# Patient Record
Sex: MALE | Race: WHITE | Employment: OTHER | URBAN - METROPOLITAN AREA
[De-identification: names, ages, dates, MRNs, and addresses within clinical notes are randomized per-mention and may not be internally consistent; named-entity substitution may affect disease eponyms.]

---

## 2022-08-13 ENCOUNTER — APPOINTMENT (OUTPATIENT)
Dept: GENERAL RADIOLOGY | Age: 72
End: 2022-08-13
Payer: MEDICARE

## 2022-08-13 ENCOUNTER — APPOINTMENT (OUTPATIENT)
Dept: CT IMAGING | Age: 72
End: 2022-08-13
Payer: MEDICARE

## 2022-08-13 ENCOUNTER — HOSPITAL ENCOUNTER (EMERGENCY)
Age: 72
Discharge: HOME | End: 2022-08-13
Attending: STUDENT IN AN ORGANIZED HEALTH CARE EDUCATION/TRAINING PROGRAM
Payer: MEDICARE

## 2022-08-13 VITALS
DIASTOLIC BLOOD PRESSURE: 70 MMHG | SYSTOLIC BLOOD PRESSURE: 147 MMHG | OXYGEN SATURATION: 94 % | HEART RATE: 60 BPM | WEIGHT: 175 LBS | BODY MASS INDEX: 25.92 KG/M2 | HEIGHT: 69 IN | TEMPERATURE: 97.7 F | RESPIRATION RATE: 13 BRPM

## 2022-08-13 DIAGNOSIS — R55 VASOVAGAL SYNCOPE: ICD-10-CM

## 2022-08-13 DIAGNOSIS — S61.011A LACERATION OF RIGHT THUMB WITHOUT FOREIGN BODY WITHOUT DAMAGE TO NAIL, INITIAL ENCOUNTER: Primary | ICD-10-CM

## 2022-08-13 LAB
ALBUMIN SERPL-MCNC: 3.8 G/DL (ref 3.5–5.2)
ALP BLD-CCNC: 76 U/L (ref 40–129)
ALT SERPL-CCNC: 16 U/L (ref 0–40)
ANION GAP SERPL CALCULATED.3IONS-SCNC: 12 MMOL/L (ref 7–16)
AST SERPL-CCNC: 21 U/L (ref 0–39)
BACTERIA: ABNORMAL /HPF
BASOPHILS ABSOLUTE: 0.06 E9/L (ref 0–0.2)
BASOPHILS RELATIVE PERCENT: 0.7 % (ref 0–2)
BETA-HYDROXYBUTYRATE: 0.15 MMOL/L (ref 0.02–0.27)
BILIRUB SERPL-MCNC: <0.2 MG/DL (ref 0–1.2)
BILIRUBIN URINE: NEGATIVE
BLOOD, URINE: ABNORMAL
BUN BLDV-MCNC: 21 MG/DL (ref 6–23)
CALCIUM SERPL-MCNC: 9.4 MG/DL (ref 8.6–10.2)
CHLORIDE BLD-SCNC: 101 MMOL/L (ref 98–107)
CHP ED QC CHECK: NORMAL
CLARITY: CLEAR
CO2: 23 MMOL/L (ref 22–29)
COLOR: YELLOW
CREAT SERPL-MCNC: 1.3 MG/DL (ref 0.7–1.2)
EOSINOPHILS ABSOLUTE: 0.23 E9/L (ref 0.05–0.5)
EOSINOPHILS RELATIVE PERCENT: 2.6 % (ref 0–6)
GFR AFRICAN AMERICAN: >60
GFR NON-AFRICAN AMERICAN: 54 ML/MIN/1.73
GLUCOSE BLD-MCNC: 158 MG/DL (ref 74–99)
GLUCOSE BLD-MCNC: 200 MG/DL
GLUCOSE URINE: NEGATIVE MG/DL
HCT VFR BLD CALC: 38.2 % (ref 37–54)
HEMOGLOBIN: 11.8 G/DL (ref 12.5–16.5)
IMMATURE GRANULOCYTES #: 0.03 E9/L
IMMATURE GRANULOCYTES %: 0.3 % (ref 0–5)
KETONES, URINE: NEGATIVE MG/DL
LACTIC ACID: 1.8 MMOL/L (ref 0.5–2.2)
LACTIC ACID: 2.7 MMOL/L (ref 0.5–2.2)
LEUKOCYTE ESTERASE, URINE: NEGATIVE
LIPASE: 55 U/L (ref 13–60)
LYMPHOCYTES ABSOLUTE: 1.15 E9/L (ref 1.5–4)
LYMPHOCYTES RELATIVE PERCENT: 12.8 % (ref 20–42)
MCH RBC QN AUTO: 27.5 PG (ref 26–35)
MCHC RBC AUTO-ENTMCNC: 30.9 % (ref 32–34.5)
MCV RBC AUTO: 89 FL (ref 80–99.9)
METER GLUCOSE: 200 MG/DL (ref 74–99)
MONOCYTES ABSOLUTE: 0.89 E9/L (ref 0.1–0.95)
MONOCYTES RELATIVE PERCENT: 9.9 % (ref 2–12)
NEUTROPHILS ABSOLUTE: 6.62 E9/L (ref 1.8–7.3)
NEUTROPHILS RELATIVE PERCENT: 73.7 % (ref 43–80)
NITRITE, URINE: NEGATIVE
PDW BLD-RTO: 15.9 FL (ref 11.5–15)
PH UA: 5.5 (ref 5–9)
PH VENOUS: 7.39 (ref 7.35–7.45)
PLATELET # BLD: 187 E9/L (ref 130–450)
PMV BLD AUTO: 10.3 FL (ref 7–12)
POTASSIUM REFLEX MAGNESIUM: 4 MMOL/L (ref 3.5–5)
PRO-BNP: 400 PG/ML (ref 0–125)
PROTEIN UA: ABNORMAL MG/DL
RBC # BLD: 4.29 E12/L (ref 3.8–5.8)
RBC UA: ABNORMAL /HPF (ref 0–2)
SODIUM BLD-SCNC: 136 MMOL/L (ref 132–146)
SPECIFIC GRAVITY UA: 1.01 (ref 1–1.03)
TOTAL PROTEIN: 6.7 G/DL (ref 6.4–8.3)
TROPONIN, HIGH SENSITIVITY: 11 NG/L (ref 0–11)
TROPONIN, HIGH SENSITIVITY: 12 NG/L (ref 0–11)
TSH SERPL DL<=0.05 MIU/L-ACNC: 9.14 UIU/ML (ref 0.27–4.2)
UROBILINOGEN, URINE: 0.2 E.U./DL
WBC # BLD: 9 E9/L (ref 4.5–11.5)
WBC UA: ABNORMAL /HPF (ref 0–5)

## 2022-08-13 PROCEDURE — 83690 ASSAY OF LIPASE: CPT

## 2022-08-13 PROCEDURE — 93005 ELECTROCARDIOGRAM TRACING: CPT | Performed by: STUDENT IN AN ORGANIZED HEALTH CARE EDUCATION/TRAINING PROGRAM

## 2022-08-13 PROCEDURE — 2580000003 HC RX 258: Performed by: STUDENT IN AN ORGANIZED HEALTH CARE EDUCATION/TRAINING PROGRAM

## 2022-08-13 PROCEDURE — 84484 ASSAY OF TROPONIN QUANT: CPT

## 2022-08-13 PROCEDURE — 83605 ASSAY OF LACTIC ACID: CPT

## 2022-08-13 PROCEDURE — 73130 X-RAY EXAM OF HAND: CPT

## 2022-08-13 PROCEDURE — 70450 CT HEAD/BRAIN W/O DYE: CPT

## 2022-08-13 PROCEDURE — 82010 KETONE BODYS QUAN: CPT

## 2022-08-13 PROCEDURE — 83880 ASSAY OF NATRIURETIC PEPTIDE: CPT

## 2022-08-13 PROCEDURE — 85025 COMPLETE CBC W/AUTO DIFF WBC: CPT

## 2022-08-13 PROCEDURE — 2500000003 HC RX 250 WO HCPCS: Performed by: STUDENT IN AN ORGANIZED HEALTH CARE EDUCATION/TRAINING PROGRAM

## 2022-08-13 PROCEDURE — 96372 THER/PROPH/DIAG INJ SC/IM: CPT

## 2022-08-13 PROCEDURE — 90714 TD VACC NO PRESV 7 YRS+ IM: CPT | Performed by: STUDENT IN AN ORGANIZED HEALTH CARE EDUCATION/TRAINING PROGRAM

## 2022-08-13 PROCEDURE — 71045 X-RAY EXAM CHEST 1 VIEW: CPT

## 2022-08-13 PROCEDURE — 84443 ASSAY THYROID STIM HORMONE: CPT

## 2022-08-13 PROCEDURE — 81001 URINALYSIS AUTO W/SCOPE: CPT

## 2022-08-13 PROCEDURE — 90471 IMMUNIZATION ADMIN: CPT | Performed by: STUDENT IN AN ORGANIZED HEALTH CARE EDUCATION/TRAINING PROGRAM

## 2022-08-13 PROCEDURE — 82962 GLUCOSE BLOOD TEST: CPT

## 2022-08-13 PROCEDURE — 96360 HYDRATION IV INFUSION INIT: CPT

## 2022-08-13 PROCEDURE — 36415 COLL VENOUS BLD VENIPUNCTURE: CPT

## 2022-08-13 PROCEDURE — 99285 EMERGENCY DEPT VISIT HI MDM: CPT

## 2022-08-13 PROCEDURE — 80053 COMPREHEN METABOLIC PANEL: CPT

## 2022-08-13 PROCEDURE — 12002 RPR S/N/AX/GEN/TRNK2.6-7.5CM: CPT

## 2022-08-13 PROCEDURE — 82800 BLOOD PH: CPT

## 2022-08-13 PROCEDURE — 6360000002 HC RX W HCPCS: Performed by: STUDENT IN AN ORGANIZED HEALTH CARE EDUCATION/TRAINING PROGRAM

## 2022-08-13 RX ORDER — EZETIMIBE 10 MG/1
10 TABLET ORAL DAILY
COMMUNITY

## 2022-08-13 RX ORDER — ATORVASTATIN CALCIUM 40 MG/1
40 TABLET, FILM COATED ORAL DAILY
COMMUNITY

## 2022-08-13 RX ORDER — 0.9 % SODIUM CHLORIDE 0.9 %
1000 INTRAVENOUS SOLUTION INTRAVENOUS ONCE
Status: COMPLETED | OUTPATIENT
Start: 2022-08-13 | End: 2022-08-13

## 2022-08-13 RX ORDER — LOSARTAN POTASSIUM 25 MG/1
25 TABLET ORAL DAILY
COMMUNITY

## 2022-08-13 RX ORDER — TRAVOPROST OPHTHALMIC SOLUTION 0.04 MG/ML
1 SOLUTION OPHTHALMIC NIGHTLY
COMMUNITY

## 2022-08-13 RX ORDER — LEVOTHYROXINE SODIUM 0.12 MG/1
125 TABLET ORAL DAILY
COMMUNITY

## 2022-08-13 RX ORDER — TETANUS AND DIPHTHERIA TOXOIDS ADSORBED 2; 2 [LF]/.5ML; [LF]/.5ML
0.5 INJECTION INTRAMUSCULAR ONCE
Status: COMPLETED | OUTPATIENT
Start: 2022-08-13 | End: 2022-08-13

## 2022-08-13 RX ORDER — ESCITALOPRAM OXALATE 20 MG/1
20 TABLET ORAL DAILY
COMMUNITY

## 2022-08-13 RX ORDER — LIDOCAINE HYDROCHLORIDE 10 MG/ML
20 INJECTION, SOLUTION INFILTRATION; PERINEURAL ONCE
Status: COMPLETED | OUTPATIENT
Start: 2022-08-13 | End: 2022-08-13

## 2022-08-13 RX ADMIN — SODIUM CHLORIDE 1000 ML: 9 INJECTION, SOLUTION INTRAVENOUS at 16:47

## 2022-08-13 RX ADMIN — TETANUS AND DIPHTHERIA TOXOIDS ADSORBED 0.5 ML: 2; 2 INJECTION INTRAMUSCULAR at 16:41

## 2022-08-13 RX ADMIN — LIDOCAINE HYDROCHLORIDE 20 ML: 10 INJECTION, SOLUTION INFILTRATION; PERINEURAL at 17:43

## 2022-08-13 RX ADMIN — SODIUM CHLORIDE 1000 ML: 9 INJECTION, SOLUTION INTRAVENOUS at 17:47

## 2022-08-13 NOTE — ED PROVIDER NOTES
Department of Emergency Medicine   ED Provider Note  Admit Date/RoomTime: 8/13/2022  3:13 PM  ED Room: 01/01          History of Present Illness:  8/13/22, Time: 3:31 PM EDT         Zulma Cheadle is a 66 y.o. male w/ hx of DM, Grave's disease, COPD, presenting to the ED for altered mental status, beginning this morning. The complaint has been intermittent, moderate in severity, and worsened by nothing. Per family patient was confused and altered this morning, patient does not remember what happened. States that he was hypoglycemic, but did not have anything to check his blood glucose, given 3 glucose tabs orally by family. Per EMS blood glucose greater than 500. Patient currently feels well, states that this morning he accidentally slammed his right thumb in a car door. He denies any chest pain or shortness of breath, denies any new vision changes, endorses a mild headache. Denies any falls or head trauma. He denies any abdominal pain or nausea or vomiting or diarrhea. Denies any fevers or chills, but states he felt warm this morning. He denies any diarrhea or constipation or urinary symptoms. He denies any similar episodes in the past.  He thinks he had tetanus vaccine around 2 years ago. Review of Systems:     Pertinent positives and negatives are stated within HPI. 10 point ROS otherwise negative.  --------------------------------------------- PAST HISTORY ---------------------------------------------  Past Medical History:  has a past medical history of COPD (chronic obstructive pulmonary disease) ([facility] [address] Past Surgical History:  has no past surgical history on file. Social History:  reports that he has been smoking cigarettes. He has been smoking an average of 1 pack per day. He has never used smokeless tobacco. He reports that he does not use drugs. Family History: family history is not on file. The patients home medications have been reviewed. Allergies:  Avelox [moxifloxacin], Morphine, Penicillins, and Sulfa antibiotics        ---------------------------------------------------PHYSICAL EXAM--------------------------------------    Constitutional/General: AAO to person/place/time/purpose, NAD, no labored breathing  Head: Normocephalic and atraumatic  Eyes: EOMI, conjunctiva normal, sclera non icteric  Mouth: Moist mucous membranes, uvula midline  Neck: Supple, no stridor, no meningeal signs  Respiratory: Lungs clear to auscultation bilaterally, no wheezes, rales, or rhonchi. Not in respiratory distress  Cardiovascular:  Regular rate. Regular rhythm. No murmurs, no gallops, or rubs. 2+ distal pulses. Equal extremity pulses. Chest: No chest wall tenderness or deformity  GI:  Abdomen Soft, Non tender, Non distended. No rebound, guarding, or rigidity. No pulsatile masses. Musculoskeletal: Moves all extremities x 4. Warm and well perfused, no clubbing, cyanosis, or edema. Capillary refill <3 seconds  Integument: skin warm and dry. 3 cm laceration right thumb pad. Neurologic: GCS 15, no focal deficits. Initial NIH 0. Patient alert/keenly responsive, oriented to month and age. Following commands appropriately. No aphasia or dysarthria, able to identify objects and describe their use. No extinction or inattention. CN II-XII intact (e.g. extraocular movements intact, facial sensation intact to light touch, no facial asymmetry, tongue midline with intact lateral movements, normal speech, hearing grossly intact bilaterally, no gross visual field deficits bilaterally, no weakness with shrug/lateral head movements)  No drift of bilateral upper and lower extremities bilaterally (no drift for 10 sec w/ arms, no drift 5 sec w/ legs). Strength 5/5 in major muscle groups of bilateral upper and lower extremities. Sensation intact to light touch and sharp touch throughout dermatomes of bilateral upper and lower extremities.   No limb ataxia with normal finger-to-nose testing and heel-to-shin testing bilaterally. Psychiatric: Normal Affect      -----------------------------------------PROCEDURES----------------------------------------------------  Laceration Repair Procedure Note    Indication: Laceration    Procedure: The patient was placed in the appropriate position and anesthesia around the laceration was obtained with a full digital block of the right thumb using 1% Lidocaine without epinephrine. The area was then cleansed with betadine and draped in a sterile fashion. The laceration was irrigated with copious months of sterile saline. No foreign bodies were identified. No significant subungual hematoma. The laceration was closed with 5-0 Prolene using interrupted sutures. There were no additional lacerations requiring repair. The wound area was then dressed with bacitracin and a sterile dressing. Minimal debridement was preformed, flaps were aligned. No foreign body was identified. Total repaired wound length: 3 cm. Other Items: Suture count: 4    The patient tolerated the procedure well. Complications: None     -------------------------------------------------- RESULTS -------------------------------------------------  I have personally reviewed all laboratory and imaging results for this patient. Results are listed below.      LABS:  Results for orders placed or performed during the hospital encounter of 08/13/22   CBC with Auto Differential   Result Value Ref Range    WBC 9.0 4.5 - 11.5 E9/L    RBC 4.29 3.80 - 5.80 E12/L    Hemoglobin 11.8 (L) 12.5 - 16.5 g/dL    Hematocrit 38.2 37.0 - 54.0 %    MCV 89.0 80.0 - 99.9 fL    MCH 27.5 26.0 - 35.0 pg    MCHC 30.9 (L) 32.0 - 34.5 %    RDW 15.9 (H) 11.5 - 15.0 fL    Platelets 678 315 - 754 E9/L    MPV 10.3 7.0 - 12.0 fL    Neutrophils % 73.7 43.0 - 80.0 %    Immature Granulocytes % 0.3 0.0 - 5.0 %    Lymphocytes % 12.8 (L) 20.0 - 42.0 %    Monocytes % 9.9 2.0 - 12.0 %    Eosinophils % 2.6 0.0 - 6.0 %    Basophils % 0.7 0.0 - 2.0 % Neutrophils Absolute 6.62 1.80 - 7.30 E9/L    Immature Granulocytes # 0.03 E9/L    Lymphocytes Absolute 1.15 (L) 1.50 - 4.00 E9/L    Monocytes Absolute 0.89 0.10 - 0.95 E9/L    Eosinophils Absolute 0.23 0.05 - 0.50 E9/L    Basophils Absolute 0.06 0.00 - 0.20 E9/L   Comprehensive Metabolic Panel w/ Reflex to MG   Result Value Ref Range    Sodium 136 132 - 146 mmol/L    Potassium reflex Magnesium 4.0 3.5 - 5.0 mmol/L    Chloride 101 98 - 107 mmol/L    CO2 23 22 - 29 mmol/L    Anion Gap 12 7 - 16 mmol/L    Glucose 158 (H) 74 - 99 mg/dL    BUN 21 6 - 23 mg/dL    Creatinine 1.3 (H) 0.7 - 1.2 mg/dL    GFR Non-African American 54 >=60 mL/min/1.73    GFR African American >60     Calcium 9.4 8.6 - 10.2 mg/dL    Total Protein 6.7 6.4 - 8.3 g/dL    Albumin 3.8 3.5 - 5.2 g/dL    Total Bilirubin <0.2 0.0 - 1.2 mg/dL    Alkaline Phosphatase 76 40 - 129 U/L    ALT 16 0 - 40 U/L    AST 21 0 - 39 U/L   Lactic Acid   Result Value Ref Range    Lactic Acid 2.7 (H) 0.5 - 2.2 mmol/L   Urinalysis with Microscopic   Result Value Ref Range    Color, UA Yellow Straw/Yellow    Clarity, UA Clear Clear    Glucose, Ur Negative Negative mg/dL    Bilirubin Urine Negative Negative    Ketones, Urine Negative Negative mg/dL    Specific Gravity, UA 1.015 1.005 - 1.030    Blood, Urine TRACE (A) Negative    pH, UA 5.5 5.0 - 9.0    Protein, UA TRACE Negative mg/dL    Urobilinogen, Urine 0.2 <2.0 E.U./dL    Nitrite, Urine Negative Negative    Leukocyte Esterase, Urine Negative Negative    WBC, UA NONE 0 - 5 /HPF    RBC, UA NONE 0 - 2 /HPF    Bacteria, UA NONE SEEN None Seen /HPF   Troponin   Result Value Ref Range    Troponin, High Sensitivity 12 (H) 0 - 11 ng/L   Lipase   Result Value Ref Range    Lipase 55 13 - 60 U/L   Brain Natriuretic Peptide   Result Value Ref Range    Pro- (H) 0 - 125 pg/mL   TSH   Result Value Ref Range    TSH 9.140 (H) 0.270 - 4.200 uIU/mL   PH, VENOUS   Result Value Ref Range    pH, [place] 7.39 7.35 - 7.45 Beta-Hydroxybutyrate   Result Value Ref Range    Beta-Hydroxybutyrate 0.15 0.02 - 0.27 mmol/L   Troponin   Result Value Ref Range    Troponin, High Sensitivity 11 0 - 11 ng/L   Lactic Acid   Result Value Ref Range    Lactic Acid 1.8 0.5 - 2.2 mmol/L   POCT Glucose   Result Value Ref Range    Glucose 200 mg/dL    QC OK? ok    POCT Glucose   Result Value Ref Range    Meter Glucose 200 (H) 74 - 99 mg/dL   EKG 12 Lead   Result Value Ref Range    Ventricular Rate 56 BPM    Atrial Rate 56 BPM    P-R Interval 136 ms    QRS Duration 102 ms    Q-T Interval 440 ms    QTc Calculation (Bazett) 424 ms    P Axis 22 degrees    R Axis -5 degrees    T Axis 28 degrees       RADIOLOGY:  Interpreted by Radiologist unless otherwise specified  XR CHEST 1 VIEW   Final Result   No pneumonia or pleural effusion. XR HAND RIGHT (MIN 3 VIEWS)   Final Result   No evidence of fracture or dislocation. CT Head WO Contrast   Final Result   No acute intracranial abnormality. EKG:      See ED Course for EKG documentation        ------------------------- NURSING NOTES AND VITALS REVIEWED ---------------------------   The nursing notes within the ED encounter and vital signs as below have been reviewed by myself. BP (!) 147/70   Pulse 60   Temp 97.7 °F (36.5 °C)   Resp 13   Ht 5' 9\" (1.753 m)   Wt 175 lb (79.4 kg)   SpO2 94%   BMI 25.84 kg/m²   Oxygen Saturation Interpretation: Normal    The patients available past medical records and past encounters were reviewed.         ------------------------------ ED COURSE/MEDICAL DECISION MAKING----------------------  Medications   diptheria-tetanus toxoids [facility]) 2-2 LF/0.5ML injection 0.5 mL (0.5 mLs IntraMUSCular Given 8/13/22 1641)   0.9 % sodium chloride bolus (0 mLs IntraVENous Stopped 8/13/22 1750)   0.9 % sodium chloride bolus (0 mLs IntraVENous Stopped 8/13/22 0713)   lidocaine 1 % injection 20 mL (20 mLs IntraDERmal Given by Other 8/13/22 8541) Medical Decision Making: This is a 13-year-old male presenting to the emergency department for episode of altered mental status, thumb laceration. Initial history was gathered from patient and he did not remember the episode. Further information gathered from wife is that patient had episode of confusion after seeing the blood from his thumb, suspect vasovagal syncope. CT imaging obtained of the head without acute intracranial process. Patient here with no focal neurodeficits, fully oriented. Patient normotensive, afebrile, not tachycardic, well-appearing. No lactic acidosis, stable on repeat. Troponin stable on repeat. No evidence of DKA, lab work overall unremarkable and reassuring. Patient updated on tetanus vaccine, x-ray obtained of the thumb without evidence of foreign body or acute fracture dislocation. Laceration was repaired. Given patient's reassuring work-up, exam, patient will be discharged home with outpatient follow-up and strict return precautions. Patient and wife comfortable with this plan all questions were answered. See ED COURSE for additional MDM. Labs & imaging reviewed and interpreted, see RESULTS above. Re-Evaluations:             ED Course as of 08/14/22 0037   Sun Aug 14, 2022   6372 EKG: This EKG is signed and interpreted by me. Rate: 56  Rhythm: Sinus  Interpretation: no acute changes  Comparison: no previous EKG   [RH]      ED Course User Index  [RH] [address], DO         This patient's ED course included: a personal history and physicial examination, re-evaluation prior to disposition, multiple bedside re-evaluations, cardiac monitoring, and continuous pulse oximetry    This patient has remained hemodynamically stable during their ED course. Consultations:  See ED Course    Counseling:    The emergency provider has spoken with the patient and spouse/SO and discussed todays results, in addition to providing specific details for the plan of care and counseling regarding the diagnosis and prognosis. Questions are answered at this time and they are agreeable with the plan.       --------------------------------- IMPRESSION AND DISPOSITION ---------------------------------    IMPRESSION  1. Laceration of right thumb without foreign body without damage to nail, initial encounter    2. Vasovagal syncope        DISPOSITION  Disposition: Discharge to home  Patient condition is stable    NOTE: This report was transcribed using voice recognition software. Every effort was made to ensure accuracy; however, inadvertent computerized transcription errors may be present. Also please note that patient was seen and examined by attending physician. Plan of care and disposition discussed with attending physician and they were immediately available or present for all procedures performed.        -- Chanel Masters D.O. PGY-3     Resident Physician     Emergency Medicine      8/14/2022 12:37 AM         [address] DO  Resident  08/14/22 9411

## 2022-08-14 LAB
EKG ATRIAL RATE: 56 BPM
EKG P AXIS: 22 DEGREES
EKG P-R INTERVAL: 136 MS
EKG Q-T INTERVAL: 440 MS
EKG QRS DURATION: 102 MS
EKG QTC CALCULATION (BAZETT): 424 MS
EKG R AXIS: -5 DEGREES
EKG T AXIS: 28 DEGREES
EKG VENTRICULAR RATE: 56 BPM

## 2024-04-29 ENCOUNTER — APPOINTMENT (OUTPATIENT)
Dept: RADIOLOGY | Facility: HOSPITAL | Age: 74
End: 2024-04-29
Payer: MEDICARE

## 2024-04-29 ENCOUNTER — APPOINTMENT (OUTPATIENT)
Dept: CARDIOLOGY | Facility: HOSPITAL | Age: 74
End: 2024-04-29
Payer: MEDICARE

## 2024-04-29 ENCOUNTER — HOSPITAL ENCOUNTER (OUTPATIENT)
Dept: CARDIOLOGY | Facility: HOSPITAL | Age: 74
Discharge: HOME | End: 2024-04-29
Payer: MEDICARE

## 2024-04-29 ENCOUNTER — HOSPITAL ENCOUNTER (OUTPATIENT)
Facility: HOSPITAL | Age: 74
Setting detail: OBSERVATION
Discharge: HOME | End: 2024-04-30
Attending: EMERGENCY MEDICINE | Admitting: INTERNAL MEDICINE
Payer: MEDICARE

## 2024-04-29 DIAGNOSIS — R06.89 DYSPNEA AND RESPIRATORY ABNORMALITY: ICD-10-CM

## 2024-04-29 DIAGNOSIS — I67.89 OTHER CEREBROVASCULAR DISEASE: ICD-10-CM

## 2024-04-29 DIAGNOSIS — R56.9 SEIZURE-LIKE ACTIVITY (MULTI): ICD-10-CM

## 2024-04-29 DIAGNOSIS — R29.90 STROKE-LIKE SYMPTOMS: Primary | ICD-10-CM

## 2024-04-29 DIAGNOSIS — R42 DYSEQUILIBRIUM: ICD-10-CM

## 2024-04-29 DIAGNOSIS — I24.9 ACS (ACUTE CORONARY SYNDROME) (MULTI): ICD-10-CM

## 2024-04-29 DIAGNOSIS — R06.00 DYSPNEA AND RESPIRATORY ABNORMALITY: ICD-10-CM

## 2024-04-29 DIAGNOSIS — R55 SYNCOPE AND COLLAPSE: ICD-10-CM

## 2024-04-29 DIAGNOSIS — N17.9 AKI (ACUTE KIDNEY INJURY) (CMS-HCC): ICD-10-CM

## 2024-04-29 DIAGNOSIS — R60.0 LOCALIZED EDEMA: ICD-10-CM

## 2024-04-29 LAB
ALBUMIN SERPL BCP-MCNC: 4.1 G/DL (ref 3.4–5)
ALP SERPL-CCNC: 95 U/L (ref 33–136)
ALT SERPL W P-5'-P-CCNC: 14 U/L (ref 10–52)
ANION GAP SERPL CALC-SCNC: 11 MMOL/L (ref 10–20)
APPEARANCE UR: CLEAR
AST SERPL W P-5'-P-CCNC: 19 U/L (ref 9–39)
BASOPHILS # BLD AUTO: 0.06 X10*3/UL (ref 0–0.1)
BASOPHILS NFR BLD AUTO: 0.8 %
BILIRUB SERPL-MCNC: 0.3 MG/DL (ref 0–1.2)
BILIRUB UR STRIP.AUTO-MCNC: NEGATIVE MG/DL
BNP SERPL-MCNC: 86 PG/ML (ref 0–99)
BUN SERPL-MCNC: 17 MG/DL (ref 6–23)
CALCIUM SERPL-MCNC: 9.4 MG/DL (ref 8.6–10.3)
CARDIAC TROPONIN I PNL SERPL HS: 8 NG/L (ref 0–20)
CHLORIDE SERPL-SCNC: 104 MMOL/L (ref 98–107)
CHOLEST SERPL-MCNC: 121 MG/DL (ref 0–199)
CHOLESTEROL/HDL RATIO: 3.4
CO2 SERPL-SCNC: 26 MMOL/L (ref 21–32)
COLOR UR: ABNORMAL
CREAT SERPL-MCNC: 1.56 MG/DL (ref 0.5–1.3)
D DIMER PPP FEU-MCNC: 1097 NG/ML FEU
EGFRCR SERPLBLD CKD-EPI 2021: 46 ML/MIN/1.73M*2
EOSINOPHIL # BLD AUTO: 0.21 X10*3/UL (ref 0–0.4)
EOSINOPHIL NFR BLD AUTO: 2.7 %
ERYTHROCYTE [DISTWIDTH] IN BLOOD BY AUTOMATED COUNT: 19.2 % (ref 11.5–14.5)
GLUCOSE BLD MANUAL STRIP-MCNC: 91 MG/DL (ref 74–99)
GLUCOSE SERPL-MCNC: 107 MG/DL (ref 74–99)
GLUCOSE UR STRIP.AUTO-MCNC: ABNORMAL MG/DL
HCT VFR BLD AUTO: 44.8 % (ref 41–52)
HDLC SERPL-MCNC: 35.2 MG/DL
HGB BLD-MCNC: 13.4 G/DL (ref 13.5–17.5)
IMM GRANULOCYTES # BLD AUTO: 0.02 X10*3/UL (ref 0–0.5)
IMM GRANULOCYTES NFR BLD AUTO: 0.3 % (ref 0–0.9)
KETONES UR STRIP.AUTO-MCNC: NEGATIVE MG/DL
LDLC SERPL CALC-MCNC: 42 MG/DL
LEUKOCYTE ESTERASE UR QL STRIP.AUTO: NEGATIVE
LYMPHOCYTES # BLD AUTO: 1.65 X10*3/UL (ref 0.8–3)
LYMPHOCYTES NFR BLD AUTO: 21.2 %
MAGNESIUM SERPL-MCNC: 2.19 MG/DL (ref 1.6–2.4)
MCH RBC QN AUTO: 23.6 PG (ref 26–34)
MCHC RBC AUTO-ENTMCNC: 29.9 G/DL (ref 32–36)
MCV RBC AUTO: 79 FL (ref 80–100)
MONOCYTES # BLD AUTO: 1.01 X10*3/UL (ref 0.05–0.8)
MONOCYTES NFR BLD AUTO: 13 %
NEUTROPHILS # BLD AUTO: 4.82 X10*3/UL (ref 1.6–5.5)
NEUTROPHILS NFR BLD AUTO: 62 %
NITRITE UR QL STRIP.AUTO: NEGATIVE
NON HDL CHOLESTEROL: 86 MG/DL (ref 0–149)
NRBC BLD-RTO: 0 /100 WBCS (ref 0–0)
PH UR STRIP.AUTO: 5 [PH]
PLATELET # BLD AUTO: 275 X10*3/UL (ref 150–450)
POTASSIUM SERPL-SCNC: 3.9 MMOL/L (ref 3.5–5.3)
PROT SERPL-MCNC: 7.6 G/DL (ref 6.4–8.2)
PROT UR STRIP.AUTO-MCNC: ABNORMAL MG/DL
RBC # BLD AUTO: 5.68 X10*6/UL (ref 4.5–5.9)
RBC # UR STRIP.AUTO: ABNORMAL /UL
RBC #/AREA URNS AUTO: NORMAL /HPF
SODIUM SERPL-SCNC: 137 MMOL/L (ref 136–145)
SP GR UR STRIP.AUTO: 1.01
TRIGL SERPL-MCNC: 221 MG/DL (ref 0–149)
UROBILINOGEN UR STRIP.AUTO-MCNC: <2 MG/DL
VLDL: 44 MG/DL (ref 0–40)
WBC # BLD AUTO: 7.8 X10*3/UL (ref 4.4–11.3)
WBC #/AREA URNS AUTO: NORMAL /HPF

## 2024-04-29 PROCEDURE — 83880 ASSAY OF NATRIURETIC PEPTIDE: CPT | Performed by: NURSE PRACTITIONER

## 2024-04-29 PROCEDURE — 36415 COLL VENOUS BLD VENIPUNCTURE: CPT | Performed by: NURSE PRACTITIONER

## 2024-04-29 PROCEDURE — 2500000004 HC RX 250 GENERAL PHARMACY W/ HCPCS (ALT 636 FOR OP/ED): Performed by: STUDENT IN AN ORGANIZED HEALTH CARE EDUCATION/TRAINING PROGRAM

## 2024-04-29 PROCEDURE — 82947 ASSAY GLUCOSE BLOOD QUANT: CPT | Mod: 59

## 2024-04-29 PROCEDURE — 71045 X-RAY EXAM CHEST 1 VIEW: CPT | Performed by: RADIOLOGY

## 2024-04-29 PROCEDURE — 2500000001 HC RX 250 WO HCPCS SELF ADMINISTERED DRUGS (ALT 637 FOR MEDICARE OP): Performed by: STUDENT IN AN ORGANIZED HEALTH CARE EDUCATION/TRAINING PROGRAM

## 2024-04-29 PROCEDURE — 72125 CT NECK SPINE W/O DYE: CPT | Performed by: STUDENT IN AN ORGANIZED HEALTH CARE EDUCATION/TRAINING PROGRAM

## 2024-04-29 PROCEDURE — 80061 LIPID PANEL: CPT | Performed by: STUDENT IN AN ORGANIZED HEALTH CARE EDUCATION/TRAINING PROGRAM

## 2024-04-29 PROCEDURE — 96372 THER/PROPH/DIAG INJ SC/IM: CPT | Performed by: STUDENT IN AN ORGANIZED HEALTH CARE EDUCATION/TRAINING PROGRAM

## 2024-04-29 PROCEDURE — 83036 HEMOGLOBIN GLYCOSYLATED A1C: CPT | Performed by: STUDENT IN AN ORGANIZED HEALTH CARE EDUCATION/TRAINING PROGRAM

## 2024-04-29 PROCEDURE — 99285 EMERGENCY DEPT VISIT HI MDM: CPT | Mod: 25

## 2024-04-29 PROCEDURE — 72125 CT NECK SPINE W/O DYE: CPT

## 2024-04-29 PROCEDURE — 83735 ASSAY OF MAGNESIUM: CPT | Performed by: NURSE PRACTITIONER

## 2024-04-29 PROCEDURE — 85379 FIBRIN DEGRADATION QUANT: CPT | Performed by: NURSE PRACTITIONER

## 2024-04-29 PROCEDURE — 71045 X-RAY EXAM CHEST 1 VIEW: CPT

## 2024-04-29 PROCEDURE — 93005 ELECTROCARDIOGRAM TRACING: CPT

## 2024-04-29 PROCEDURE — 80053 COMPREHEN METABOLIC PANEL: CPT | Performed by: NURSE PRACTITIONER

## 2024-04-29 PROCEDURE — G0378 HOSPITAL OBSERVATION PER HR: HCPCS

## 2024-04-29 PROCEDURE — 85025 COMPLETE CBC W/AUTO DIFF WBC: CPT | Performed by: NURSE PRACTITIONER

## 2024-04-29 PROCEDURE — 70450 CT HEAD/BRAIN W/O DYE: CPT

## 2024-04-29 PROCEDURE — 84484 ASSAY OF TROPONIN QUANT: CPT | Performed by: NURSE PRACTITIONER

## 2024-04-29 PROCEDURE — 2500000004 HC RX 250 GENERAL PHARMACY W/ HCPCS (ALT 636 FOR OP/ED): Performed by: EMERGENCY MEDICINE

## 2024-04-29 PROCEDURE — 70450 CT HEAD/BRAIN W/O DYE: CPT | Performed by: RADIOLOGY

## 2024-04-29 PROCEDURE — 81001 URINALYSIS AUTO W/SCOPE: CPT | Performed by: NURSE PRACTITIONER

## 2024-04-29 RX ORDER — LATANOPROST 50 UG/ML
1 SOLUTION/ DROPS OPHTHALMIC NIGHTLY
Status: DISCONTINUED | OUTPATIENT
Start: 2024-04-29 | End: 2024-05-01 | Stop reason: HOSPADM

## 2024-04-29 RX ORDER — SODIUM CHLORIDE 9 MG/ML
125 INJECTION, SOLUTION INTRAVENOUS CONTINUOUS
Status: DISCONTINUED | OUTPATIENT
Start: 2024-04-29 | End: 2024-04-30

## 2024-04-29 RX ORDER — BUPROPION HYDROCHLORIDE 150 MG/1
150 TABLET ORAL DAILY
Status: DISCONTINUED | OUTPATIENT
Start: 2024-04-30 | End: 2024-05-01 | Stop reason: HOSPADM

## 2024-04-29 RX ORDER — ACETAMINOPHEN 325 MG/1
650 TABLET ORAL EVERY 4 HOURS PRN
Status: DISCONTINUED | OUTPATIENT
Start: 2024-04-29 | End: 2024-05-01 | Stop reason: HOSPADM

## 2024-04-29 RX ORDER — METOPROLOL SUCCINATE 50 MG/1
50 TABLET, EXTENDED RELEASE ORAL DAILY
Status: DISCONTINUED | OUTPATIENT
Start: 2024-04-30 | End: 2024-05-01 | Stop reason: HOSPADM

## 2024-04-29 RX ORDER — LANOLIN ALCOHOL/MO/W.PET/CERES
1000 CREAM (GRAM) TOPICAL DAILY
Status: DISCONTINUED | OUTPATIENT
Start: 2024-04-30 | End: 2024-05-01 | Stop reason: HOSPADM

## 2024-04-29 RX ORDER — GABAPENTIN 100 MG/1
100 CAPSULE ORAL DAILY
Status: DISCONTINUED | OUTPATIENT
Start: 2024-04-30 | End: 2024-05-01 | Stop reason: HOSPADM

## 2024-04-29 RX ORDER — ATORVASTATIN CALCIUM 40 MG/1
40 TABLET, FILM COATED ORAL DAILY
Status: DISCONTINUED | OUTPATIENT
Start: 2024-04-30 | End: 2024-05-01 | Stop reason: HOSPADM

## 2024-04-29 RX ORDER — FLAXSEED OIL 1000 MG
1 CAPSULE ORAL DAILY
COMMUNITY

## 2024-04-29 RX ORDER — ESCITALOPRAM OXALATE 20 MG/1
20 TABLET ORAL DAILY
COMMUNITY

## 2024-04-29 RX ORDER — ALBUTEROL SULFATE 90 UG/1
2 AEROSOL, METERED RESPIRATORY (INHALATION) EVERY 6 HOURS PRN
COMMUNITY

## 2024-04-29 RX ORDER — LEVOTHYROXINE SODIUM 125 UG/1
125 TABLET ORAL DAILY
COMMUNITY

## 2024-04-29 RX ORDER — EZETIMIBE 10 MG/1
10 TABLET ORAL DAILY
COMMUNITY

## 2024-04-29 RX ORDER — ALBUTEROL SULFATE 90 UG/1
2 AEROSOL, METERED RESPIRATORY (INHALATION) EVERY 6 HOURS PRN
Status: DISCONTINUED | OUTPATIENT
Start: 2024-04-29 | End: 2024-04-29

## 2024-04-29 RX ORDER — HEPARIN SODIUM 5000 [USP'U]/ML
5000 INJECTION, SOLUTION INTRAVENOUS; SUBCUTANEOUS EVERY 8 HOURS
Status: DISCONTINUED | OUTPATIENT
Start: 2024-04-29 | End: 2024-05-01 | Stop reason: HOSPADM

## 2024-04-29 RX ORDER — GABAPENTIN 100 MG/1
100 CAPSULE ORAL DAILY
COMMUNITY

## 2024-04-29 RX ORDER — ATORVASTATIN CALCIUM 40 MG/1
40 TABLET, FILM COATED ORAL DAILY
COMMUNITY

## 2024-04-29 RX ORDER — INSULIN LISPRO 100 [IU]/ML
0-5 INJECTION, SOLUTION INTRAVENOUS; SUBCUTANEOUS
Status: DISCONTINUED | OUTPATIENT
Start: 2024-04-29 | End: 2024-05-01 | Stop reason: HOSPADM

## 2024-04-29 RX ORDER — ESCITALOPRAM OXALATE 10 MG/1
20 TABLET ORAL DAILY
Status: DISCONTINUED | OUTPATIENT
Start: 2024-04-30 | End: 2024-05-01 | Stop reason: HOSPADM

## 2024-04-29 RX ORDER — EZETIMIBE 10 MG/1
10 TABLET ORAL DAILY
Status: DISCONTINUED | OUTPATIENT
Start: 2024-04-30 | End: 2024-05-01 | Stop reason: HOSPADM

## 2024-04-29 RX ORDER — NAPROXEN SODIUM 220 MG/1
81 TABLET, FILM COATED ORAL DAILY
Status: DISCONTINUED | OUTPATIENT
Start: 2024-04-29 | End: 2024-05-01 | Stop reason: HOSPADM

## 2024-04-29 RX ORDER — ACETAMINOPHEN 500 MG
5 TABLET ORAL NIGHTLY PRN
Status: DISCONTINUED | OUTPATIENT
Start: 2024-04-29 | End: 2024-05-01 | Stop reason: HOSPADM

## 2024-04-29 RX ORDER — FLUTICASONE FUROATE AND VILANTEROL 200; 25 UG/1; UG/1
1 POWDER RESPIRATORY (INHALATION)
Status: DISCONTINUED | OUTPATIENT
Start: 2024-04-29 | End: 2024-05-01 | Stop reason: HOSPADM

## 2024-04-29 RX ORDER — FAMOTIDINE 20 MG/1
20 TABLET, FILM COATED ORAL DAILY
Status: DISCONTINUED | OUTPATIENT
Start: 2024-04-30 | End: 2024-05-01 | Stop reason: HOSPADM

## 2024-04-29 RX ORDER — GLUCOSAM/CHONDRO/HERB 149/HYAL 750-100 MG
1 TABLET ORAL DAILY
Status: DISCONTINUED | OUTPATIENT
Start: 2024-04-29 | End: 2024-04-29 | Stop reason: RX

## 2024-04-29 RX ORDER — DEXTROSE 50 % IN WATER (D50W) INTRAVENOUS SYRINGE
25
Status: DISCONTINUED | OUTPATIENT
Start: 2024-04-29 | End: 2024-05-01 | Stop reason: HOSPADM

## 2024-04-29 RX ORDER — ACETAMINOPHEN 650 MG/1
650 SUPPOSITORY RECTAL EVERY 4 HOURS PRN
Status: DISCONTINUED | OUTPATIENT
Start: 2024-04-29 | End: 2024-05-01 | Stop reason: HOSPADM

## 2024-04-29 RX ORDER — SODIUM CHLORIDE 9 MG/ML
125 INJECTION, SOLUTION INTRAVENOUS CONTINUOUS
Status: CANCELLED | OUTPATIENT
Start: 2024-04-29 | End: 2024-04-29

## 2024-04-29 RX ORDER — METOPROLOL SUCCINATE 50 MG/1
50 TABLET, EXTENDED RELEASE ORAL DAILY
COMMUNITY

## 2024-04-29 RX ORDER — DEXTROSE 50 % IN WATER (D50W) INTRAVENOUS SYRINGE
12.5
Status: DISCONTINUED | OUTPATIENT
Start: 2024-04-29 | End: 2024-05-01 | Stop reason: HOSPADM

## 2024-04-29 RX ORDER — LEVOTHYROXINE SODIUM 125 UG/1
125 TABLET ORAL DAILY
Status: DISCONTINUED | OUTPATIENT
Start: 2024-04-30 | End: 2024-05-01 | Stop reason: HOSPADM

## 2024-04-29 RX ORDER — ONDANSETRON HYDROCHLORIDE 2 MG/ML
4 INJECTION, SOLUTION INTRAVENOUS EVERY 8 HOURS PRN
Status: DISCONTINUED | OUTPATIENT
Start: 2024-04-29 | End: 2024-05-01 | Stop reason: HOSPADM

## 2024-04-29 RX ORDER — BUPROPION HYDROCHLORIDE 150 MG/1
150 TABLET ORAL DAILY
COMMUNITY

## 2024-04-29 RX ORDER — LANOLIN ALCOHOL/MO/W.PET/CERES
1000 CREAM (GRAM) TOPICAL DAILY
COMMUNITY

## 2024-04-29 RX ORDER — ACETAMINOPHEN 160 MG/5ML
650 SOLUTION ORAL EVERY 4 HOURS PRN
Status: DISCONTINUED | OUTPATIENT
Start: 2024-04-29 | End: 2024-05-01 | Stop reason: HOSPADM

## 2024-04-29 RX ORDER — TRAVOPROST OPHTHALMIC SOLUTION 0.04 MG/ML
1 SOLUTION OPHTHALMIC NIGHTLY
COMMUNITY

## 2024-04-29 RX ORDER — CHOLECALCIFEROL (VITAMIN D3) 1250 MCG
50000 TABLET ORAL
COMMUNITY

## 2024-04-29 RX ORDER — ONDANSETRON 4 MG/1
4 TABLET, ORALLY DISINTEGRATING ORAL EVERY 8 HOURS PRN
Status: DISCONTINUED | OUTPATIENT
Start: 2024-04-29 | End: 2024-05-01 | Stop reason: HOSPADM

## 2024-04-29 RX ORDER — ALBUTEROL SULFATE 90 UG/1
2 AEROSOL, METERED RESPIRATORY (INHALATION) EVERY 2 HOUR PRN
Status: DISCONTINUED | OUTPATIENT
Start: 2024-04-29 | End: 2024-05-01 | Stop reason: HOSPADM

## 2024-04-29 RX ORDER — FAMOTIDINE 20 MG/1
20 TABLET, FILM COATED ORAL DAILY
COMMUNITY

## 2024-04-29 RX ORDER — GLUCOSAM/CHONDRO/HERB 149/HYAL 750-100 MG
1 TABLET ORAL DAILY
COMMUNITY

## 2024-04-29 RX ADMIN — HEPARIN SODIUM 5000 UNITS: 5000 INJECTION INTRAVENOUS; SUBCUTANEOUS at 18:13

## 2024-04-29 RX ADMIN — ACETAMINOPHEN 650 MG: 325 TABLET ORAL at 20:28

## 2024-04-29 RX ADMIN — ASPIRIN 81 MG CHEWABLE TABLET 81 MG: 81 TABLET CHEWABLE at 18:13

## 2024-04-29 RX ADMIN — SODIUM CHLORIDE 125 ML/HR: 9 INJECTION, SOLUTION INTRAVENOUS at 16:17

## 2024-04-29 SDOH — SOCIAL STABILITY: SOCIAL INSECURITY: HAVE YOU HAD ANY THOUGHTS OF HARMING ANYONE ELSE?: NO

## 2024-04-29 SDOH — SOCIAL STABILITY: SOCIAL INSECURITY: HAS ANYONE EVER THREATENED TO HURT YOUR FAMILY OR YOUR PETS?: NO

## 2024-04-29 SDOH — SOCIAL STABILITY: SOCIAL INSECURITY: WERE YOU ABLE TO COMPLETE ALL THE BEHAVIORAL HEALTH SCREENINGS?: YES

## 2024-04-29 SDOH — SOCIAL STABILITY: SOCIAL INSECURITY: DO YOU FEEL UNSAFE GOING BACK TO THE PLACE WHERE YOU ARE LIVING?: NO

## 2024-04-29 SDOH — SOCIAL STABILITY: SOCIAL INSECURITY: HAVE YOU HAD THOUGHTS OF HARMING ANYONE ELSE?: NO

## 2024-04-29 SDOH — SOCIAL STABILITY: SOCIAL INSECURITY: DO YOU FEEL ANYONE HAS EXPLOITED OR TAKEN ADVANTAGE OF YOU FINANCIALLY OR OF YOUR PERSONAL PROPERTY?: NO

## 2024-04-29 SDOH — SOCIAL STABILITY: SOCIAL INSECURITY: ARE THERE ANY APPARENT SIGNS OF INJURIES/BEHAVIORS THAT COULD BE RELATED TO ABUSE/NEGLECT?: NO

## 2024-04-29 SDOH — SOCIAL STABILITY: SOCIAL INSECURITY: ARE YOU OR HAVE YOU BEEN THREATENED OR ABUSED PHYSICALLY, EMOTIONALLY, OR SEXUALLY BY ANYONE?: NO

## 2024-04-29 SDOH — SOCIAL STABILITY: SOCIAL INSECURITY: DOES ANYONE TRY TO KEEP YOU FROM HAVING/CONTACTING OTHER FRIENDS OR DOING THINGS OUTSIDE YOUR HOME?: NO

## 2024-04-29 SDOH — SOCIAL STABILITY: SOCIAL INSECURITY: ABUSE: ADULT

## 2024-04-29 ASSESSMENT — PATIENT HEALTH QUESTIONNAIRE - PHQ9
2. FEELING DOWN, DEPRESSED OR HOPELESS: NOT AT ALL
SUM OF ALL RESPONSES TO PHQ9 QUESTIONS 1 & 2: 0
1. LITTLE INTEREST OR PLEASURE IN DOING THINGS: NOT AT ALL

## 2024-04-29 ASSESSMENT — COGNITIVE AND FUNCTIONAL STATUS - GENERAL
CLIMB 3 TO 5 STEPS WITH RAILING: A LITTLE
MOVING TO AND FROM BED TO CHAIR: A LITTLE
PATIENT BASELINE BEDBOUND: NO
TOILETING: A LITTLE
STANDING UP FROM CHAIR USING ARMS: A LITTLE
MOBILITY SCORE: 20
WALKING IN HOSPITAL ROOM: A LITTLE
DAILY ACTIVITIY SCORE: 23

## 2024-04-29 ASSESSMENT — COLUMBIA-SUICIDE SEVERITY RATING SCALE - C-SSRS
2. HAVE YOU ACTUALLY HAD ANY THOUGHTS OF KILLING YOURSELF?: NO
1. IN THE PAST MONTH, HAVE YOU WISHED YOU WERE DEAD OR WISHED YOU COULD GO TO SLEEP AND NOT WAKE UP?: NO
1. IN THE PAST MONTH, HAVE YOU WISHED YOU WERE DEAD OR WISHED YOU COULD GO TO SLEEP AND NOT WAKE UP?: NO
6. HAVE YOU EVER DONE ANYTHING, STARTED TO DO ANYTHING, OR PREPARED TO DO ANYTHING TO END YOUR LIFE?: NO
6. HAVE YOU EVER DONE ANYTHING, STARTED TO DO ANYTHING, OR PREPARED TO DO ANYTHING TO END YOUR LIFE?: NO
2. HAVE YOU ACTUALLY HAD ANY THOUGHTS OF KILLING YOURSELF?: NO

## 2024-04-29 ASSESSMENT — LIFESTYLE VARIABLES
EVER HAD A DRINK FIRST THING IN THE MORNING TO STEADY YOUR NERVES TO GET RID OF A HANGOVER: NO
SKIP TO QUESTIONS 9-10: 1
HOW MANY STANDARD DRINKS CONTAINING ALCOHOL DO YOU HAVE ON A TYPICAL DAY: PATIENT DOES NOT DRINK
HAVE PEOPLE ANNOYED YOU BY CRITICIZING YOUR DRINKING: NO
AUDIT-C TOTAL SCORE: 0
HAVE YOU EVER FELT YOU SHOULD CUT DOWN ON YOUR DRINKING: NO
TOTAL SCORE: 0
HOW OFTEN DO YOU HAVE 6 OR MORE DRINKS ON ONE OCCASION: NEVER
SUBSTANCE_ABUSE_PAST_12_MONTHS: NO
EVER FELT BAD OR GUILTY ABOUT YOUR DRINKING: NO
HOW OFTEN DO YOU HAVE A DRINK CONTAINING ALCOHOL: NEVER
AUDIT-C TOTAL SCORE: 0
PRESCIPTION_ABUSE_PAST_12_MONTHS: NO

## 2024-04-29 ASSESSMENT — ACTIVITIES OF DAILY LIVING (ADL)
LACK_OF_TRANSPORTATION: NO
PATIENT'S MEMORY ADEQUATE TO SAFELY COMPLETE DAILY ACTIVITIES?: YES
GROOMING: INDEPENDENT
BATHING: INDEPENDENT
HEARING - RIGHT EAR: FUNCTIONAL
FEEDING YOURSELF: INDEPENDENT
DRESSING YOURSELF: INDEPENDENT
JUDGMENT_ADEQUATE_SAFELY_COMPLETE_DAILY_ACTIVITIES: YES
TOILETING: INDEPENDENT
WALKS IN HOME: NEEDS ASSISTANCE
ADEQUATE_TO_COMPLETE_ADL: YES
HEARING - LEFT EAR: FUNCTIONAL

## 2024-04-29 ASSESSMENT — PAIN SCALES - GENERAL: PAINLEVEL_OUTOF10: 8

## 2024-04-29 ASSESSMENT — PAIN - FUNCTIONAL ASSESSMENT: PAIN_FUNCTIONAL_ASSESSMENT: 0-10

## 2024-04-29 NOTE — H&P
"Medicine History & Physical    Patient Name: Dillon Reyes   YOB: 1950    Subjective:    Dillon Reyes is a 73 y.o. male with HTN, HLD, DM, CAD s/p PCI, Graves ophthalmopathy, history of TIA who presents to the hospital with balance issues. He had an episode where he says he lost consciousness after returning from the bathroom very early Saturday morning. Denies prodromal symptoms. He hit his head, unknown for sure on what. His wife had heard him fall and he was sitting up against the wall when she arrived and when she asked him what happened, all he had said was \"dizzy\". The patient does not remember the episode and says he could hear his wife calling out for him but could not move or talk for about a minute. He has had a bilateral frontal headache since the fall, but has improved compared to onset and is improved with tylenol. The patient has also had difficulty with balance every time he walks and with occasional \"shift\" in his vision. The patient has had word finding difficulty and some memory issues since the fall. He had some numbness, tingling and stiffness of his right hand, along with his right arm feeling cold, but these symptoms have subsided. He typically sleeps on his right side, but has been unable to since the fall because of nausea which resolves if he turns to his left or forward. The nausea sensation occurs only if his body is on the right side, denies this occurring if he just moves his head to the right. He has baseline issues with vision of his left eye from Graves and wears a patch. He has his baseline cough and dyspnea from COPD. He says his last TIA was at least 10 years ago, but doesn't remember what his symptoms were. Denies focal weakness, room spinning dizziness, lightheadedness, vomiting, diarrhea, abdominal pain, leg pain/swelling. Denies any new medications or change in medications.    Vitals with HR 66, /69. Creatinine 1.56 (was 1.97 12/2023), D-dimer 1097. CXR " negative. CT head/C spine negative for acute process. He was started on IV fluids.    A 10 point ROS was completed and is negative expect as stated in HPI.     Past Medical History:  COPD/chronic bronchitis  Graves and iatrogenic hypothyroidism  DM  PTSD  Anxiety/depression  CKD III  HTN  HLD  CAD  TIA    Past Surgical History:  Cholecystectomy  Coronary stents  Arthroscopy of knees    Social History: Tobacco - Current User, smokes 1 ppd, Alcohol - none, Recreational Drugs - none    Family History: MI (dad), DM (dad), cancer (mother)    Objective:    /69   Pulse 66   Temp 36.7 °C (98.1 °F)   Resp 18   Wt 85.7 kg (189 lb)   SpO2 94%   BMI 29.84 kg/m²     Physical Exam:  General:  Pleasant and cooperative. No apparent distress.  HEENT:  Normocephalic, bruising right temporal forehead, moist mucous membranes. Patch over left eye. Hearing aid.  Chest:  Clear to auscultation bilaterally. Non-labored breathing.  CV:  Regular rate and rhythm.    Abdomen: Abdomen is soft, non-tender, non-distended.   Extremities:  Trace lower extremity edema.   Neurological: AAOx3. Vision and ocular testing not performed on left eye. CN II-IV, VI intact for right eye. CN V, VII-XII intact. Abnormal finger to nose bilaterally, worse on right. Normal heel to shin. 5/5 strength. Normal sensation. Rarely says close but wrong word (ex. Bladder instead of gallbladder) or has to correct himself.   Skin:  Warm and dry.  Psych: Appropriate affect    Assessment/Plan:  #Dysequilibrium, word finding difficulty  #Syncope/fall  #History of TIA  -Differential includes CVA, BPPV, concussion, vs other, less likely seizure  -Consult neurology  -Order MRI brain and MRA head/neck, echo  -Check A1c and lipid panel  -Resume home atorvastatin 40 mg daily and ezetimibe. Start aspirin. Will defer addition of plavix to neuro.   -Holter monitor at discharge  -PT/OT  -Stop IV fluids from ED after receive 1 liter. Check orthostatics.  -Elevated D-dimer  noted, no symptoms/signs of VTE, would not pursue further evaluation at this time    #HTN  #HLD  #DM2  #COPD  #Depression/anxiety/PTSD  #Graves with iatrogenic hypothyroidism  #CAD s/p PCI  #CKD III  #Tobacco dependence  -Resume wellbutrin, lexapro, inhaler, gabapentin, metoprolol succinate, levothyroxine. Statin and ezetimibe as above.  -Hold jardiance for now. Start SSI.  -Declines nicotine patch at this time    DVT Prophylaxis: heparin SQ  Code Status: full  Consults: neuro

## 2024-04-29 NOTE — ED PROVIDER NOTES
HPI   Chief Complaint   Patient presents with    Fall    Syncope     Pt arrives private auto from home. States Friday night/early sat AM he had syncopal episode in hallway and fell on floor. States he hit his head and head hurts. States balance still feels off and having pain in head. Denies anticoagulants. States nausea/vision issues Saturday after falling. Has not been seen since fall.        Patient 73-year-old male, medical history significant for coronary vascular disease, Graves' disease with resulting double vision, history of optic neuritis, COPD not on home oxygen, presents to the emergency department with acute onset dizziness, fall, and head injury.  Patient was in his normal state of health.  He states that Friday night and Saturday morning, he was ambulating to the bathroom.  He states he cannot recall exactly what happened but he fell to the ground.  Shortly thereafter, he states that he was feeling very dizzy.  He has had persistent dizziness since then.  He states he had a difficult time walking.  He is also had trouble with word finding.  He denies any history of prior stroke.  He is not on anticoagulation.  He denies any chest pain or shortness of breath.                          No data recorded                   Patient History   Past Medical History:   Diagnosis Date    Atherosclerotic heart disease of native coronary artery without angina pectoris 11/08/2020    Coronary artery disease    Diplopia 09/13/2022    Diplopia    Dizziness and giddiness 04/29/2015    Vertigo    Eyelid retraction left upper eyelid 09/13/2022    Eyelid retraction left upper eyelid    Eyelid retraction right upper eyelid 09/13/2022    Eyelid retraction right upper eyelid    Headache, unspecified 04/08/2015    Headache    Other optic neuritis 09/13/2022    Compressive optic neuropathy    Other specified disorders of eye and adnexa 09/13/2022    Thyroid eye disease    Personal history of other diseases of the circulatory  system     History of cardiac disorder    Personal history of other diseases of the circulatory system     History of hypertension    Personal history of other diseases of the circulatory system     History of coronary artery disease    Personal history of other diseases of the circulatory system 11/11/2018    History of coronary artery disease    Personal history of other diseases of the musculoskeletal system and connective tissue     History of arthritis    Personal history of other diseases of the respiratory system     History of lung disease    Personal history of other endocrine, nutritional and metabolic disease     History of hyperlipidemia    Personal history of other endocrine, nutritional and metabolic disease     History of hypothyroidism    Personal history of other mental and behavioral disorders     History of depression    Syncope and collapse 11/08/2020    Syncope    Thyrotoxicosis 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 storm     Graves' eye disease    Transient cerebral ischemic attack, unspecified     TIA (transient ischemic attack)    Unspecified exophthalmos 09/13/2022    Proptosis     Past Surgical History:   Procedure Laterality Date    CARDIAC CATHETERIZATION  10/18/2018    Cardiac Cath Procedure Outcome:    CARPAL TUNNEL RELEASE  04/08/2015    Neuroplasty Median Nerve At Carpal Tunnel    MR HEAD ANGIO WO IV CONTRAST  1/25/2017    MR HEAD ANGIO WO IV CONTRAST 1/25/2017 [facility]    MR NECK ANGIO WO IV CONTRAST  1/25/2017    MR NECK ANGIO WO IV CONTRAST 1/25/2017 [facility]    OTHER SURGICAL HISTORY  12/20/2018    Knee arthroscopy    OTHER SURGICAL HISTORY  12/20/2018    Cholecystectomy    OTHER SURGICAL HISTORY  10/29/2020    Cataract surgery     No family history on file.  Social History     Tobacco Use    Smoking status: Not on file    Smokeless tobacco: Not on file   Substance Use Topics    Alcohol use: Not on file    Drug use: Not on file       Physical  Exam   ED Triage Vitals [04/29/24 1434]   Temperature Heart Rate Respirations BP   36.7 °C (98.1 °F) 66 18 135/69      Pulse Ox Temp src Heart Rate Source Patient Position   94 % -- -- --      BP Location FiO2 (%)     -- --       Physical Exam  Vitals and nursing note reviewed.   Constitutional:       General: He is not in acute distress.     Appearance: He is well-developed.   HENT:      Head: Normocephalic and atraumatic.   Eyes:      Extraocular Movements: Extraocular movements intact.      Conjunctiva/sclera: Conjunctivae normal.      Pupils: Pupils are equal, round, and reactive to light.   Cardiovascular:      Rate and Rhythm: Normal rate and regular rhythm.      Heart sounds: No murmur heard.  Pulmonary:      Effort: Pulmonary effort is normal. No respiratory distress.      Breath sounds: Normal breath sounds.   Abdominal:      Palpations: Abdomen is soft.      Tenderness: There is no abdominal tenderness.   Musculoskeletal:         General: No swelling.      Cervical back: Neck supple.   Skin:     General: Skin is warm and dry.      Capillary Refill: Capillary refill takes less than 2 seconds.   Neurological:      General: No focal deficit present.      Mental Status: He is alert and oriented to person, place, and time.      Gait: Gait abnormal.   Psychiatric:         Mood and Affect: Mood normal.         ED Course & MDM   ED Course as of 04/29/24 1634   Mon Apr 29, 2024   1552 Labs demonstrate renal insufficiency. [MK]   1553 CBC demonstrates an mild anemia. [MK]   1553 Cardiac enzymes normal. [MK]   1553 CT head shows chronic change without acute infiltrative process. [MK]   1553 Chest x-ray demonstrates normal cardiac silhouette without infiltrates or effusions. [MK]   1606 CT cervical spine shows no fracture.  There is advanced degenerative changes. [MK]      ED Course User Index  [MK] George Sam MD         Diagnoses as of 04/29/24 1634   Stroke-like symptoms       Medical Decision Making  Medical  Decision Making: Patient symptoms do seem concerning for posterior stroke.  He had ataxia, some word finding difficulty, and dizziness.  On arrival, NIH is only 1 for his ataxia.  Symptoms have been present for greater than 48 hours so stroke team was not activated.  Patient is not a TNK candidate.  Noncontrast head CT was negative.  Given his fall, CT of the cervical spine was obtained which was also unremarkable.  Labs do show mild renal insufficiency which is chronic after discussion with the patient.  At this point, given the patient will require admission for further neurologic workup.  D-dimer was obtained in triage and is mildly elevated, but the patient has not had any dyspnea, is not hypoxic, does not describe pleuritic chest pain.  I am unsure what to do with his elevated results given his lack of symptoms.    EKG interpreted by myself (ED attending physician): Sinus rhythm.  Rate of 67.  Occasional PAC.  No acute ischemia.  No STEMI.    Differential Diagnoses Considered: TIA, stroke, intracranial hemorrhage, electrolyte disturbance    Chronic Medical Conditions Significantly Affecting Care: Hypertension, hyperlipidemia, cardiovascular disease, chronic any disease, Graves'    External Records Reviewed: I reviewed recent and relevant outside records including: Outpatient medication reconciliation    Independent Interpretation of Studies:  I independently interpreted: CT is obtained and reviewed    Escalation of Care:  Appropriate for hospitalization    Social Determinants of Health Significantly Affecting Care:  No social determinants    Prescription Drug Consideration: Gentle fluid    Diagnostic testing considered: It is noncontributory    Discussion of Management with Other Providers:   I discussed the patient/results with: Admitting provider agrees to plan of care          Procedure  Procedures     George Sam MD  04/29/24 2840

## 2024-04-29 NOTE — ED TRIAGE NOTES
Secondary to patient volumes and overcrowding, I performed a brief medical screening exam of the patient in triage, as the patient awaits space in the main ED.    History of Present Illness:  Dillon Reyes presents with   Chief Complaint   Patient presents with    Fall    Syncope     Pt arrives private auto from home. States Friday night/early sat AM he had syncopal episode in hallway and fell on floor. States he hit his head and head hurts. States balance still feels off and having pain in head. Denies anticoagulants. States nausea/vision issues Saturday after falling. Has not been seen since fall.        Physical Exam:  General - In no acute distress  Respiratory - Breathing comfortably  Cardiac - Normal S1, S2, no m/g/r  Neurologic - Moving all extremities    Medical Decision Making:  Patient will require further evaluation in the main ED.    Initial diagnostic tests were ordered from triage.      The patient demonstrates understanding that this initial evaluation is a brief medical screening exam and the expectation is that they await for space in the main ED to be further evaluated.  The patient understands that, if they leave prior to further evaluation in the main ED after this initial evaluation in triage, they are doing so under their own accord knowing that their evaluation/work-up is not yet complete. The patient also understands that any preliminary diagnostic results, including abnormalities, may not be shared with them, if they choose to leave prior to further evaluation in the main ED.

## 2024-04-29 NOTE — PROGRESS NOTES
Pharmacy Medication History Review    Dillon Reyes is a 73 y.o. male admitted for No Principal Problem: There is no principal problem currently on the Problem List. Please update the Problem List and refresh.. Pharmacy reviewed the patient's [id number] medications and allergies for accuracy.    The list below reflectives the updated PTA list. Please review each medication in order reconciliation for additional clarification and justification.  Prior to Admission medications    Medication Sig Start Date End Date Taking? Authorizing Provider   albuterol 90 mcg/actuation inhaler Inhale 2 puffs every 6 hours if needed.   Yes Historical Provider, MD   atorvastatin (Lipitor) 40 mg tablet Take 1 tablet (40 mg) by mouth once daily.   Yes Historical Provider, MD   buPROPion XL (Wellbutrin XL) 150 mg 24 hr tablet Take 1 tablet (150 mg) by mouth once daily.   Yes Historical Provider, MD   empagliflozin (Jardiance) 25 mg Take 1 tablet (25 mg) by mouth once daily.   Yes Historical Provider, MD   escitalopram (Lexapro) 20 mg tablet Take 1 tablet (20 mg) by mouth once daily.   Yes Historical Provider, MD   ezetimibe (Zetia) 10 mg tablet Take 1 tablet (10 mg) by mouth once daily.   Yes Historical Provider, MD   famotidine (Pepcid) 20 mg tablet Take 1 tablet (20 mg) by mouth once daily.   Yes Historical Provider, MD   fluticasone-umeclidin-vilanter (TRELEGY-ELLIPTA) 200-62.5-25 mcg blister with device Inhale 1 puff once daily.   Yes Historical Provider, MD   gabapentin (Neurontin) 100 mg capsule Take 1 capsule (100 mg) by mouth once daily.   Yes Historical Provider, MD   levothyroxine (Synthroid, Levoxyl) 125 mcg tablet Take 1 tablet (125 mcg) by mouth once daily.   Yes Historical Provider, MD   metoprolol succinate XL (Toprol-XL) 50 mg 24 hr tablet Take 1 tablet (50 mg) by mouth once daily.   Yes Historical Provider, MD   travoprost (Travatan Z) 0.004 % drops ophthalmic solution Administer 1 drop into both eyes once daily  at bedtime.   Yes Historical Provider, MD   cholecalciferol (Vitamin D3) 1,250 mcg (50,000 unit) tablet Take 1 tablet (50,000 Units) by mouth 1 (one) time per week.    Historical Provider, MD   cyanocobalamin (Vitamin B-12) 1,000 mcg tablet Take 1 tablet (1,000 mcg) by mouth once daily.    Historical Provider, MD   flaxseed oiL 1,000 mg capsule Take 1 capsule (1,000 mg) by mouth once daily.    Historical Provider, MD   omega 3-dha-epa-fish oil (Fish OiL) 1,000 mg (120 mg-180 mg) capsule Take 1 capsule (1,000 mg) by mouth once daily.    Historical Provider, MD        The list below reflectives the updated allergy list. Please review each documented allergy for additional clarification and justification.  Allergies  Reviewed by Marcus Plata LPN on 4/29/2024        Severity Reactions Comments    Moxifloxacin High Anaphylaxis     Sulfa (sulfonamide Antibiotics) Medium Hives, Rash     Morphine Low Rash     Penicillin Low Hives             Below are additional concerns with the patient's PTA list.      Niya Alanis

## 2024-04-30 ENCOUNTER — APPOINTMENT (OUTPATIENT)
Dept: RADIOLOGY | Facility: HOSPITAL | Age: 74
End: 2024-04-30
Payer: MEDICARE

## 2024-04-30 ENCOUNTER — APPOINTMENT (OUTPATIENT)
Dept: VASCULAR MEDICINE | Facility: HOSPITAL | Age: 74
End: 2024-04-30
Payer: MEDICARE

## 2024-04-30 ENCOUNTER — APPOINTMENT (OUTPATIENT)
Dept: CARDIOLOGY | Facility: HOSPITAL | Age: 74
End: 2024-04-30
Payer: MEDICARE

## 2024-04-30 VITALS
RESPIRATION RATE: 18 BRPM | SYSTOLIC BLOOD PRESSURE: 116 MMHG | HEIGHT: 69 IN | HEART RATE: 64 BPM | WEIGHT: 202 LBS | DIASTOLIC BLOOD PRESSURE: 72 MMHG | BODY MASS INDEX: 29.92 KG/M2 | TEMPERATURE: 98.6 F | OXYGEN SATURATION: 95 %

## 2024-04-30 PROBLEM — R42 DYSEQUILIBRIUM: Status: RESOLVED | Noted: 2024-04-29 | Resolved: 2024-04-30

## 2024-04-30 LAB
ANION GAP SERPL CALC-SCNC: 10 MMOL/L (ref 10–20)
ANION GAP SERPL CALC-SCNC: 10 MMOL/L (ref 10–20)
AORTIC VALVE MEAN GRADIENT: 10 MMHG
AORTIC VALVE PEAK VELOCITY: 2.23 M/S
AV PEAK GRADIENT: 19.9 MMHG
AVA (PEAK VEL): 1.6 CM2
AVA (VTI): 1.72 CM2
BUN SERPL-MCNC: 17 MG/DL (ref 6–23)
BUN SERPL-MCNC: 18 MG/DL (ref 6–23)
CALCIUM SERPL-MCNC: 8.5 MG/DL (ref 8.6–10.3)
CALCIUM SERPL-MCNC: 8.5 MG/DL (ref 8.6–10.3)
CHLORIDE SERPL-SCNC: 106 MMOL/L (ref 98–107)
CHLORIDE SERPL-SCNC: 108 MMOL/L (ref 98–107)
CO2 SERPL-SCNC: 24 MMOL/L (ref 21–32)
CO2 SERPL-SCNC: 24 MMOL/L (ref 21–32)
CREAT SERPL-MCNC: 1.4 MG/DL (ref 0.5–1.3)
CREAT SERPL-MCNC: 1.47 MG/DL (ref 0.5–1.3)
EGFRCR SERPLBLD CKD-EPI 2021: 50 ML/MIN/1.73M*2
EGFRCR SERPLBLD CKD-EPI 2021: 53 ML/MIN/1.73M*2
EJECTION FRACTION APICAL 4 CHAMBER: 57.2
EST. AVERAGE GLUCOSE BLD GHB EST-MCNC: 157 MG/DL
GLOBAL LONGITUDINAL STRAIN: 18.1 %
GLUCOSE BLD MANUAL STRIP-MCNC: 155 MG/DL (ref 74–99)
GLUCOSE SERPL-MCNC: 84 MG/DL (ref 74–99)
GLUCOSE SERPL-MCNC: 90 MG/DL (ref 74–99)
HBA1C MFR BLD: 7.1 %
HOLD SPECIMEN: NORMAL
LEFT VENTRICLE INTERNAL DIMENSION DIASTOLE: 5.4 CM (ref 3.5–6)
LEFT VENTRICULAR OUTFLOW TRACT DIAMETER: 2.1 CM
LV EJECTION FRACTION BIPLANE: 55 %
MITRAL VALVE E/A RATIO: 0.82
POTASSIUM SERPL-SCNC: 4 MMOL/L (ref 3.5–5.3)
POTASSIUM SERPL-SCNC: 4.3 MMOL/L (ref 3.5–5.3)
RIGHT VENTRICLE FREE WALL PEAK S': 15.7 CM/S
RIGHT VENTRICLE PEAK SYSTOLIC PRESSURE: 34.6 MMHG
SODIUM SERPL-SCNC: 136 MMOL/L (ref 136–145)
SODIUM SERPL-SCNC: 138 MMOL/L (ref 136–145)
TRICUSPID ANNULAR PLANE SYSTOLIC EXCURSION: 2.4 CM

## 2024-04-30 PROCEDURE — 71275 CT ANGIOGRAPHY CHEST: CPT | Performed by: RADIOLOGY

## 2024-04-30 PROCEDURE — 70547 MR ANGIOGRAPHY NECK W/O DYE: CPT | Mod: 59

## 2024-04-30 PROCEDURE — 96372 THER/PROPH/DIAG INJ SC/IM: CPT | Performed by: STUDENT IN AN ORGANIZED HEALTH CARE EDUCATION/TRAINING PROGRAM

## 2024-04-30 PROCEDURE — 93970 EXTREMITY STUDY: CPT | Performed by: INTERNAL MEDICINE

## 2024-04-30 PROCEDURE — 36415 COLL VENOUS BLD VENIPUNCTURE: CPT | Performed by: STUDENT IN AN ORGANIZED HEALTH CARE EDUCATION/TRAINING PROGRAM

## 2024-04-30 PROCEDURE — 82947 ASSAY GLUCOSE BLOOD QUANT: CPT | Mod: 59

## 2024-04-30 PROCEDURE — 93970 EXTREMITY STUDY: CPT

## 2024-04-30 PROCEDURE — 70544 MR ANGIOGRAPHY HEAD W/O DYE: CPT | Performed by: RADIOLOGY

## 2024-04-30 PROCEDURE — 80048 BASIC METABOLIC PNL TOTAL CA: CPT | Mod: 91 | Performed by: INTERNAL MEDICINE

## 2024-04-30 PROCEDURE — 70544 MR ANGIOGRAPHY HEAD W/O DYE: CPT | Mod: 59

## 2024-04-30 PROCEDURE — 94640 AIRWAY INHALATION TREATMENT: CPT

## 2024-04-30 PROCEDURE — 70547 MR ANGIOGRAPHY NECK W/O DYE: CPT | Performed by: RADIOLOGY

## 2024-04-30 PROCEDURE — 2500000004 HC RX 250 GENERAL PHARMACY W/ HCPCS (ALT 636 FOR OP/ED): Performed by: STUDENT IN AN ORGANIZED HEALTH CARE EDUCATION/TRAINING PROGRAM

## 2024-04-30 PROCEDURE — 99223 1ST HOSP IP/OBS HIGH 75: CPT | Performed by: PSYCHIATRY & NEUROLOGY

## 2024-04-30 PROCEDURE — G0378 HOSPITAL OBSERVATION PER HR: HCPCS

## 2024-04-30 PROCEDURE — 70551 MRI BRAIN STEM W/O DYE: CPT | Performed by: RADIOLOGY

## 2024-04-30 PROCEDURE — 71275 CT ANGIOGRAPHY CHEST: CPT

## 2024-04-30 PROCEDURE — 93356 MYOCRD STRAIN IMG SPCKL TRCK: CPT

## 2024-04-30 PROCEDURE — 93306 TTE W/DOPPLER COMPLETE: CPT

## 2024-04-30 PROCEDURE — 2500000001 HC RX 250 WO HCPCS SELF ADMINISTERED DRUGS (ALT 637 FOR MEDICARE OP): Performed by: STUDENT IN AN ORGANIZED HEALTH CARE EDUCATION/TRAINING PROGRAM

## 2024-04-30 PROCEDURE — 80048 BASIC METABOLIC PNL TOTAL CA: CPT | Performed by: STUDENT IN AN ORGANIZED HEALTH CARE EDUCATION/TRAINING PROGRAM

## 2024-04-30 PROCEDURE — 2550000001 HC RX 255 CONTRASTS: Performed by: INTERNAL MEDICINE

## 2024-04-30 PROCEDURE — 36415 COLL VENOUS BLD VENIPUNCTURE: CPT | Performed by: INTERNAL MEDICINE

## 2024-04-30 PROCEDURE — 70551 MRI BRAIN STEM W/O DYE: CPT

## 2024-04-30 RX ORDER — ACETAMINOPHEN 325 MG/1
650 TABLET ORAL EVERY 4 HOURS PRN
Start: 2024-04-30

## 2024-04-30 RX ORDER — NAPROXEN SODIUM 220 MG/1
81 TABLET, FILM COATED ORAL DAILY
Start: 2024-05-01

## 2024-04-30 RX ORDER — DEXTROSE MONOHYDRATE AND SODIUM CHLORIDE 5; .45 G/100ML; G/100ML
75 INJECTION, SOLUTION INTRAVENOUS CONTINUOUS
Status: DISCONTINUED | OUTPATIENT
Start: 2024-04-30 | End: 2024-05-01 | Stop reason: HOSPADM

## 2024-04-30 RX ORDER — CLOPIDOGREL BISULFATE 75 MG/1
75 TABLET ORAL DAILY
Status: DISCONTINUED | OUTPATIENT
Start: 2024-04-30 | End: 2024-04-30

## 2024-04-30 RX ADMIN — ASPIRIN 81 MG CHEWABLE TABLET 81 MG: 81 TABLET CHEWABLE at 12:18

## 2024-04-30 RX ADMIN — GABAPENTIN 100 MG: 100 CAPSULE ORAL at 12:19

## 2024-04-30 RX ADMIN — METOPROLOL SUCCINATE 50 MG: 50 TABLET, EXTENDED RELEASE ORAL at 12:18

## 2024-04-30 RX ADMIN — HEPARIN SODIUM 5000 UNITS: 5000 INJECTION INTRAVENOUS; SUBCUTANEOUS at 02:35

## 2024-04-30 RX ADMIN — TIOTROPIUM BROMIDE INHALATION SPRAY 2 PUFF: 3.12 SPRAY, METERED RESPIRATORY (INHALATION) at 11:54

## 2024-04-30 RX ADMIN — ATORVASTATIN CALCIUM 40 MG: 40 TABLET, FILM COATED ORAL at 12:20

## 2024-04-30 RX ADMIN — FLUTICASONE FUROATE AND VILANTEROL TRIFENATATE 1 PUFF: 200; 25 POWDER RESPIRATORY (INHALATION) at 11:57

## 2024-04-30 RX ADMIN — HEPARIN SODIUM 5000 UNITS: 5000 INJECTION INTRAVENOUS; SUBCUTANEOUS at 12:21

## 2024-04-30 RX ADMIN — IOHEXOL 75 ML: 350 INJECTION, SOLUTION INTRAVENOUS at 15:02

## 2024-04-30 RX ADMIN — ESCITALOPRAM OXALATE 20 MG: 10 TABLET ORAL at 12:20

## 2024-04-30 RX ADMIN — LATANOPROST 1 DROP: 50 SOLUTION OPHTHALMIC at 02:35

## 2024-04-30 RX ADMIN — LEVOTHYROXINE SODIUM 125 MCG: 125 TABLET ORAL at 08:03

## 2024-04-30 ASSESSMENT — PAIN SCALES - GENERAL
PAINLEVEL_OUTOF10: 0 - NO PAIN

## 2024-04-30 ASSESSMENT — PAIN - FUNCTIONAL ASSESSMENT
PAIN_FUNCTIONAL_ASSESSMENT: 0-10
PAIN_FUNCTIONAL_ASSESSMENT: 0-10

## 2024-04-30 NOTE — NURSING NOTE
04/30/24 1230 Patient Navigator  I introduced myself to the patient and explained my role. Pt states he lives with his wife and has her help if needed. He is active and does exercise and exceeds the recommendations of 30 minutes 3 times a week. Pt states he eats a healthy diet and eats a variety of foods.   He was diagnosed with diabetes 5 years ago and takes Humalog 3 times a day with SSI. He monitors his blood sugar before each meal via his glucometer- he declined a demonstration of glucometer use. He has his eyes examined yearly along with his feet assessed by a health professional every 3 months. Dr. RENETTA Blackwell is his endocrinologist whom he sees frequently. I reviewed the Stroke Folder and answered any questions. I reviewed the following lab values and what they indicate: cholesterol, HDL, LDL, triglycerides, and A1C. I gave the patient my business card & instructed him to call me as needed. He declined any handouts however he appreciated my visit and information. I updated the patient's RN of my visit.      Cierra CRUZ, RN  Patient Navigator  Stroke Educator  [facility]

## 2024-04-30 NOTE — PROGRESS NOTES
Occupational Therapy                 Therapy Communication Note    Patient Name: Dillon Reyes  MRN: [id number]  Today's Date: 4/30/2024     Discipline: Occupational Therapy    Missed Visit Reason: Missed Visit Reason:  (Chart reviewed.  Attempted OT initial eval at 9:10 am however patient in echo then going for MRI per RN.  Plan:  Continue to attempt.)    Missed Time: Attempt

## 2024-04-30 NOTE — CONSULTS
Inpatient consult to Neurology  Consult performed by: Choco Delgado MD  Consult ordered by: George Sam MD        History Of Present Illness  Dillon Reyes is a 73 y.o. male with a past medical history of hypertension, hyperlipidemia, Graves' disease, cardiovascular disease, history of TIA presenting with ataxia, difficulty with word finding and dizziness.  The patient's wife was present for his episode and helped corroborate his story.  Apparently the patient was returning from the restroom when he hit his head.  She heard a loud sound and found him sitting up against the wall endorsing dizziness.  Appeared to be postictal and was unable to respond or move for a minute.  He endorsed a mild headache, word finding difficulties and difficulty with balance including difficulty with vision.      Labs notable for renal insufficiency which patient endorses as chronic.  Lipid panel significant for elevated triglycerides, elevated VLDL.  Hemoglobin A1c of 7.1.  Urine analysis positive for glucosuria.  Elevated D-dimer of 1,097. Orthostats pending. CT cervical spine without acute fracture.  Multilevel degenerative change, no high-grade canal stenosis or neural foraminal narrowing.  CT head negative. MRI MRA results without evidence of acute infarct.  Mild to chronic mild to moderate chronic small vessel ischemic disease and generalized brain atrophy. No evidence of vessel occlusion.     Consult placed to neurology due to dysarthria and disequilibrium after a fall.    Last known well: 4/27 in the am  Had stroke symptoms resolved at time of presentation: Yes  Past Medical History  Past Medical History:   Diagnosis Date    Atherosclerotic heart disease of native coronary artery without angina pectoris 11/08/2020    Coronary artery disease    Diplopia 09/13/2022    Diplopia    Dizziness and giddiness 04/29/2015    Vertigo    Eyelid retraction left upper eyelid 09/13/2022    Eyelid retraction left upper eyelid    Eyelid  retraction right upper eyelid 09/13/2022    Eyelid retraction right upper eyelid    Headache, unspecified 04/08/2015    Headache    Other optic neuritis 09/13/2022    Compressive optic neuropathy    Other specified disorders of eye and adnexa 09/13/2022    Thyroid eye disease    Personal history of other diseases of the circulatory system     History of cardiac disorder    Personal history of other diseases of the circulatory system     History of hypertension    Personal history of other diseases of the circulatory system     History of coronary artery disease    Personal history of other diseases of the circulatory system 11/11/2018    History of coronary artery disease    Personal history of other diseases of the musculoskeletal system and connective tissue     History of arthritis    Personal history of other diseases of the respiratory system     History of lung disease    Personal history of other endocrine, nutritional and metabolic disease     History of hyperlipidemia    Personal history of other endocrine, nutritional and metabolic disease     History of hypothyroidism    Personal history of other mental and behavioral disorders     History of depression    Syncope and collapse 11/08/2020    Syncope    Thyrotoxicosis 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 storm     Graves' eye disease    Transient cerebral ischemic attack, unspecified     TIA (transient ischemic attack)    Unspecified exophthalmos 09/13/2022    Proptosis     Surgical History  Past Surgical History:   Procedure Laterality Date    CARDIAC CATHETERIZATION  10/18/2018    Cardiac Cath Procedure Outcome:    CARPAL TUNNEL RELEASE  04/08/2015    Neuroplasty Median Nerve At Carpal Tunnel    MR HEAD ANGIO WO IV CONTRAST  1/25/2017    MR HEAD ANGIO WO IV CONTRAST 1/25/2017 [facility]    MR NECK ANGIO WO IV CONTRAST  1/25/2017    MR NECK ANGIO WO IV CONTRAST 1/25/2017 [facility]    OTHER SURGICAL HISTORY  12/20/2018     "Knee arthroscopy    OTHER SURGICAL HISTORY  12/20/2018    Cholecystectomy    OTHER SURGICAL HISTORY  10/29/2020    Cataract surgery     Social History   The patient social history shows that he lives at home and smokes a pack of cigarettes a day and has done so for many years.  The patient does not drink any alcohol and is disabled [facility].  Allergies  Moxifloxacin, Sulfa (sulfonamide antibiotics), Morphine, and Penicillin  Home Medications  (Not in a hospital admission)    Family history  The patient's family history is significant for an MI  and diabetes in his father and cancer in his mother.    Review of Systems   All other systems reviewed and are negative.    Neurological Exam  Physical Exam  Last Recorded Vitals  Blood pressure 149/80, pulse 75, temperature 37 °C (98.6 °F), temperature source Temporal, resp. rate 20, height 1.753 m (5' 9\"), weight 91.6 kg (202 lb), SpO2 (!) 91%.    The patient is a well developed, [mildly obese] [male] in no acute distress.    The patient's funduscopic examination shows no papilledema bilaterally.    The patient's extremity examination shows that the pulses are 2+ in the upper and lower extremities bilaterally and there is no edema in the lower extremities bilaterally.    The patient's mental status testing is alert and oriented ×3 with no evidence of aphasia or dysarthria.  The patient's memory testing, fund of knowledge and concentration are all within normal limits.  The patient's cranial nerves 2, 3, 4, 5, 6, 7, 8, 9, 10, 11 and 12 are all within normal limits with the exception that the patient has decreased lateral movement of his left eye. The patient is currently wearing an eye patch for his thyroid eye disease..  The patient's motor testing shows normal tone, bulk, and power in the upper and lower extremities bilaterally.  The patient's sensory testing is intact to light touch in the upper and lower extremities bilaterally.  The patient's cerebellar testing is " intact in the upper and lower extremities bilaterally.  The patient's station and gait are normal.  The patient's reflexes are 1+ in the upper and lower extremities and symmetrical.  Relevant Results  Scheduled medications  aspirin, 81 mg, oral, Daily  atorvastatin, 40 mg, oral, Daily  buPROPion XL, 150 mg, oral, Daily  cyanocobalamin, 1,000 mcg, oral, Daily  escitalopram, 20 mg, oral, Daily  ezetimibe, 10 mg, oral, Daily  famotidine, 20 mg, oral, Daily  tiotropium, 2 puff, inhalation, Daily   And  fluticasone furoate-vilanteroL, 1 puff, inhalation, Daily  gabapentin, 100 mg, oral, Daily  heparin (porcine), 5,000 Units, subcutaneous, q8h  insulin lispro, 0-5 Units, subcutaneous, TID with meals  latanoprost, 1 drop, Both Eyes, Nightly  levothyroxine, 125 mcg, oral, Daily  metoprolol succinate XL, 50 mg, oral, Daily  perflutren lipid microspheres, 0.5-10 mL of dilution, intravenous, Once in imaging  perflutren protein A microsphere, 0.5 mL, intravenous, Once in imaging  sulfur hexafluoride microsphr, 2 mL, intravenous, Once in imaging      Continuous medications     PRN medications  PRN medications: acetaminophen **OR** acetaminophen **OR** acetaminophen, albuterol, dextrose, dextrose, glucagon, glucagon, melatonin, ondansetron ODT **OR** ondansetron  MR brain wo IV contrast    Result Date: 4/30/2024  Interpreted By:  Eligio Hendricks, STUDY: MR BRAIN WO IV CONTRAST; MR ANGIO NECK WO IV CONTRAST; MR ANGIO HEAD WO IV CONTRAST;  4/30/2024 10:59 am   INDICATION: Signs/Symptoms:rule out stroke.   COMPARISON: CT head dated 04/29/2024. MRI brain and MRA head and neck dated 09/19/2018.   ACCESSION NUMBER(S): [id number]; [id number]; [id number]   ORDERING CLINICIAN: ERIC SWEENEY   TECHNIQUE: 1) Standard multiplanar multisequence MR imaging was performed through the brain without intravenous contrast. 2) Noncontrast 3D time-of-flight MRA imaging was performed through the brain. 3) Noncontrast 2D time-of-flight MRA imaging  was performed through the neck.   Repeat sequences obtained due to motion artifact.   FINDINGS: BRAIN:   Parenchyma: There is no diffusion restriction abnormality to suggest acute infarct.  No evidence of recent hemorrhage. There is no mass effect or midline shift. Mild-to-moderate chronic small vessel ischemic disease with small scattered T2/FLAIR white matter hyperintensities throughout the bilateral cerebral hemispheres.   CSF Spaces: The ventricles, sulci and basal cisterns are within normal limits for age with generalized brain atrophy. Basilar cisterns are patent.   Extra-axial spaces: No extra-axial fluid collection.   Paranasal Sinuses: Mild mucosal thickening of the bilateral ethmoids. Paranasal sinuses are otherwise well aerated.   Mastoids: Trace fluid within the right-greater-than-left mastoids.   Orbits: Bilateral native lens extractions.   Calvarium: No suspicious osseous marrow signal.     VASCULAR FINDINGS:   Common carotid arteries: Patent flow signal bilaterally within the visualized portions.   External carotid arteries: Patent flow signal bilaterally.   Internal carotid arteries: Patent flow signal bilaterally. Flow related artifact versus mild atherosclerotic involvement at the internal carotid artery origins bilaterally with no substantial (less than 20%) stenosis by NASCET criteria. Intracranial segments are patent without stenosis or definite aneurysm.   Anterior cerebral arteries: Normal flow signal bilaterally.   Middle cerebral arteries: Normal flow signal bilaterally.   Vertebral arteries: Right vertebral artery is dominant. Normal flow signal bilaterally within the visualized regions.   Basilar artery: Normal flow signal.   Posterior communicating arteries: Diminutive versus absent.   Posterior cerebral arteries: Normal flow signal bilaterally.       No acute infarct, recent hemorrhage, or intracranial mass effect. Mild-to-moderate chronic small vessel ischemic disease and generalized  brain atrophy.   No evidence of source vessel arterial occlusion, flow significant stenosis, or aneurysm within the head and neck.   MACRO: None   Signed by: Eligio Hendricks 4/30/2024 11:15 AM Dictation workstation:   [id number]    MR angio head wo IV contrast    Result Date: 4/30/2024  Interpreted By:  Eligio Hendricks, STUDY: MR BRAIN WO IV CONTRAST; MR ANGIO NECK WO IV CONTRAST; MR ANGIO HEAD WO IV CONTRAST;  4/30/2024 10:59 am   INDICATION: Signs/Symptoms:rule out stroke.   COMPARISON: CT head dated 04/29/2024. MRI brain and MRA head and neck dated 09/19/2018.   ACCESSION NUMBER(S): [id number]; [id number]; [id number]   ORDERING CLINICIAN: ERIC SWEENEY   TECHNIQUE: 1) Standard multiplanar multisequence MR imaging was performed through the brain without intravenous contrast. 2) Noncontrast 3D time-of-flight MRA imaging was performed through the brain. 3) Noncontrast 2D time-of-flight MRA imaging was performed through the neck.   Repeat sequences obtained due to motion artifact.   FINDINGS: BRAIN:   Parenchyma: There is no diffusion restriction abnormality to suggest acute infarct.  No evidence of recent hemorrhage. There is no mass effect or midline shift. Mild-to-moderate chronic small vessel ischemic disease with small scattered T2/FLAIR white matter hyperintensities throughout the bilateral cerebral hemispheres.   CSF Spaces: The ventricles, sulci and basal cisterns are within normal limits for age with generalized brain atrophy. Basilar cisterns are patent.   Extra-axial spaces: No extra-axial fluid collection.   Paranasal Sinuses: Mild mucosal thickening of the bilateral ethmoids. Paranasal sinuses are otherwise well aerated.   Mastoids: Trace fluid within the right-greater-than-left mastoids.   Orbits: Bilateral native lens extractions.   Calvarium: No suspicious osseous marrow signal.     VASCULAR FINDINGS:   Common carotid arteries: Patent flow signal bilaterally within the visualized portions.    External carotid arteries: Patent flow signal bilaterally.   Internal carotid arteries: Patent flow signal bilaterally. Flow related artifact versus mild atherosclerotic involvement at the internal carotid artery origins bilaterally with no substantial (less than 20%) stenosis by NASCET criteria. Intracranial segments are patent without stenosis or definite aneurysm.   Anterior cerebral arteries: Normal flow signal bilaterally.   Middle cerebral arteries: Normal flow signal bilaterally.   Vertebral arteries: Right vertebral artery is dominant. Normal flow signal bilaterally within the visualized regions.   Basilar artery: Normal flow signal.   Posterior communicating arteries: Diminutive versus absent.   Posterior cerebral arteries: Normal flow signal bilaterally.       No acute infarct, recent hemorrhage, or intracranial mass effect. Mild-to-moderate chronic small vessel ischemic disease and generalized brain atrophy.   No evidence of source vessel arterial occlusion, flow significant stenosis, or aneurysm within the head and neck.   MACRO: None   Signed by: Eligio Hendricks 4/30/2024 11:15 AM Dictation workstation:   [id number]    MR angio neck wo IV contrast    Result Date: 4/30/2024  Interpreted By:  Eligio Hendricks, STUDY: MR BRAIN WO IV CONTRAST; MR ANGIO NECK WO IV CONTRAST; MR ANGIO HEAD WO IV CONTRAST;  4/30/2024 10:59 am   INDICATION: Signs/Symptoms:rule out stroke.   COMPARISON: CT head dated 04/29/2024. MRI brain and MRA head and neck dated 09/19/2018.   ACCESSION NUMBER(S): [id number]; [id number]; [id number]   ORDERING CLINICIAN: ERIC SWEENEY   TECHNIQUE: 1) Standard multiplanar multisequence MR imaging was performed through the brain without intravenous contrast. 2) Noncontrast 3D time-of-flight MRA imaging was performed through the brain. 3) Noncontrast 2D time-of-flight MRA imaging was performed through the neck.   Repeat sequences obtained due to motion artifact.   FINDINGS: BRAIN:    Parenchyma: There is no diffusion restriction abnormality to suggest acute infarct.  No evidence of recent hemorrhage. There is no mass effect or midline shift. Mild-to-moderate chronic small vessel ischemic disease with small scattered T2/FLAIR white matter hyperintensities throughout the bilateral cerebral hemispheres.   CSF Spaces: The ventricles, sulci and basal cisterns are within normal limits for age with generalized brain atrophy. Basilar cisterns are patent.   Extra-axial spaces: No extra-axial fluid collection.   Paranasal Sinuses: Mild mucosal thickening of the bilateral ethmoids. Paranasal sinuses are otherwise well aerated.   Mastoids: Trace fluid within the right-greater-than-left mastoids.   Orbits: Bilateral native lens extractions.   Calvarium: No suspicious osseous marrow signal.     VASCULAR FINDINGS:   Common carotid arteries: Patent flow signal bilaterally within the visualized portions.   External carotid arteries: Patent flow signal bilaterally.   Internal carotid arteries: Patent flow signal bilaterally. Flow related artifact versus mild atherosclerotic involvement at the internal carotid artery origins bilaterally with no substantial (less than 20%) stenosis by NASCET criteria. Intracranial segments are patent without stenosis or definite aneurysm.   Anterior cerebral arteries: Normal flow signal bilaterally.   Middle cerebral arteries: Normal flow signal bilaterally.   Vertebral arteries: Right vertebral artery is dominant. Normal flow signal bilaterally within the visualized regions.   Basilar artery: Normal flow signal.   Posterior communicating arteries: Diminutive versus absent.   Posterior cerebral arteries: Normal flow signal bilaterally.       No acute infarct, recent hemorrhage, or intracranial mass effect. Mild-to-moderate chronic small vessel ischemic disease and generalized brain atrophy.   No evidence of source vessel arterial occlusion, flow significant stenosis, or aneurysm  within the head and neck.   MACRO: None   Signed by: Eligio Hendricks 4/30/2024 11:15 AM Dictation workstation:   [id number]    Transthoracic Echo (TTE) Complete    Result Date: 4/30/2024    [facility], [address] [facility] [place], [place] [address] [phone] and                                 Fax [phone] TRANSTHORACIC ECHOCARDIOGRAM REPORT  Patient Name:      ANDREIAN AGUDELO         Reading Physician:    [id number] Ryan Fernández MD Study Date:        4/30/2024            Ordering Provider:    [id number] MONIKA HERRON MRN/PID:           [id number]             Fellow: Accession#:        [id number]         Nurse: Date of Birth/Age: 1950 / 73 years Sonographer:          Bethel Messer RDCS Gender:            M                    Additional Staff: Height:            175.26 cm            Admit Date:           4/29/2024 Weight:            91.63 kg             Admission Status:     Inpatient -                                                               Routine BSA / BMI:         2.07 m2 / 29.83      Encounter#:           [id number]                    kg/m2                                         Department Location:  [facility] Blood Pressure: 149 /80 mmHg Study Type:    TRANSTHORACIC ECHO (TTE) COMPLETE Diagnosis/ICD: Syncope-R55 CPT Code:      Echo Complete w Full Doppler-03940; Myocardial Strain                Imaging-[id number] Patient History: BMI:              Obese >30 Pertinent         CAD, HTN, Hyperlipidemia and TIA. s/p MI, PCI-stent x 2, History:          vertigo. Study Detail: The following Echo studies were performed: 2D, M-Mode, Doppler,               color flow and Strain. Technically challenging study due to body               habitus.  PHYSICIAN INTERPRETATION: Left Ventricle: The left ventricular systolic function is normal, with an estimated ejection fraction of 55-60%. There  are no regional wall motion abnormalities. The left ventricular cavity size is normal. Left Ventricular Global Longitudinal Strain - 18.1 %. Spectral Doppler shows an impaired relaxation pattern of left ventricular diastolic filling. Left Atrium: The left atrium is mildly dilated. Right Ventricle: The right ventricle is normal in size. There is normal right ventricular global systolic function. Right Atrium: The right atrium is normal in size. Aortic Valve: The aortic valve appears structurally normal. There is evidence of mild aortic valve stenosis. There is no evidence of aortic valve regurgitation. The peak instantaneous gradient of the aortic valve is 19.9 mmHg. The mean gradient of the aortic valve is 10.0 mmHg. Mitral Valve: The mitral valve is mild to moderately thickened. There is mild mitral valve regurgitation. Tricuspid Valve: The tricuspid valve is structurally normal. There is mild tricuspid regurgitation. Pulmonic Valve: The pulmonic valve is structurally normal. There is physiologic pulmonic valve regurgitation. Pericardium: There is no pericardial effusion noted. Aorta: The aortic root is normal.  CONCLUSIONS:  1. Left ventricular systolic function is normal with a 55-60% estimated ejection fraction.  2. Spectral Doppler shows an impaired relaxation pattern of left ventricular diastolic filling.  3. Mild aortic valve stenosis. QUANTITATIVE DATA SUMMARY: 2D MEASUREMENTS:                          Normal Ranges: IVSd:          1.20 cm   (0.6-1.1cm) LVPWd:         0.70 cm   (0.6-1.1cm) LVIDd:         5.40 cm   (3.9-5.9cm) LVIDs:         3.80 cm LV Mass Index: 93.2 g/m2 LV % FS        29.6 % LA VOLUME:                             Normal Ranges: LA Volume Index: 29.0 ml/m2 RA VOLUME BY A/L METHOD:                       Normal Ranges: RA Area A4C: 14.0 cm2 M-MODE MEASUREMENTS:                  Normal Ranges: Ao Root: 3.50 cm (2.0-3.7cm) LAs:     4.10 cm (2.7-4.0cm) AORTA MEASUREMENTS:                     Normal Ranges: Asc Ao, d: 3.30 cm (2.1-3.4cm) LV SYSTOLIC FUNCTION BY 2D PLANIMETRY (MOD):                                          Normal Ranges: EF-A4C View:                      57.2 % (>=55%) EF-A2C View:                      51.5 % EF-Biplane:                       55.3 % Global Longitudinal Strain (GLS): 18.1 % LV DIASTOLIC FUNCTION:                               Normal Ranges: MV Peak E:        1.10 m/s    (0.7-1.2 m/s) MV Peak A:        1.34 m/s    (0.42-0.7 m/s) E/A Ratio:        0.82        (1.0-2.2) MV lateral e'     0.08 m/s MV medial e'      0.07 m/s MV A Dur:         127.00 msec PulmV Sys Claudy:    70.50 cm/s PulmV Daugherty Claudy:   51.70 cm/s PulmV S/D Claudy:    1.40 PulmV A Revs Claudy: 32.40 cm/s PulmV A Revs Dur: 137.00 msec MITRAL VALVE:                 Normal Ranges: MV DT: 275 msec (150-240msec) AORTIC VALVE:                                    Normal Ranges: AoV Vmax:                2.23 m/s  (<=1.7m/s) AoV Peak P.9 mmHg (<20mmHg) AoV Mean PG:             10.0 mmHg (1.7-11.5mmHg) LVOT Max Claudy:            1.03 m/s  (<=1.1m/s) AoV VTI:                 46.80 cm  (18-25cm) LVOT VTI:                23.30 cm LVOT Diameter:           2.10 cm   (1.8-2.4cm) AoV Area, VTI:           1.72 cm2  (2.5-5.5cm2) AoV Area,Vmax:           1.60 cm2  (2.5-4.5cm2) AoV Dimensionless Index: 0.50  RIGHT VENTRICLE: RV Basal 2.90 cm RV Mid   1.90 cm RV Major 7.9 cm TAPSE:   24.1 mm RV s'    0.16 m/s TRICUSPID VALVE/RVSP:                             Normal Ranges: Peak TR Velocity: 2.81 m/s RV Syst Pressure: 34.6 mmHg (< 30mmHg) PULMONIC VALVE:                      Normal Ranges: PV Max Claudy: 1.1 m/s  (0.6-0.9m/s) PV Max P.6 mmHg Pulmonary Veins: PulmV A Revs Dur: 137.00 msec PulmV A Revs Claudy: 32.40 cm/s PulmV Daugherty Claudy:   51.70 cm/s PulmV S/D Claudy:    1.40 PulmV Sys Claudy:    70.50 cm/s  [id number] Ryan Fernández MD Electronically signed on 2024 at 10:49:28 AM  ** Final **     CT cervical spine wo IV contrast    Result  Date: 4/29/2024  Interpreted By:  Christos Garcia, STUDY: CT CERVICAL SPINE WO IV CONTRAST;  4/29/2024 3:36 pm   INDICATION: Signs/Symptoms:Fall over 64.   COMPARISON: None.   ACCESSION NUMBER(S): [id number]   ORDERING CLINICIAN: SINAI LITTLE   TECHNIQUE: Thin section axial images were obtained from the skull base down through the thoracic inlet. Sagittal and coronal reconstruction images were generated. Soft tissue, lung, and bone windows were reviewed.   FINDINGS: ALIGNMENT: Straightening of the cervical lordosis. Atlantoaxial interval is maintained. Vertebral body heights are maintained. Disc spaces are within normal limits.   VERTEBRAL BODIES AND POSTERIOR ELEMENTS: No cervical spine compression fracture.  No posterior element fracture.  No destructive bone lesion. No listhesis.   SPINAL CANAL: Multilevel degenerative changes without high-grade canal stenosis. Superimposed and multilevel anterior bridging osteophytes, compatible with DISH..   NECK SOFT TISSUES: Within normal limits.   LUNG APICES: Imaged portion of the lung apices are within normal limits.   SKULL BASE: Within normal limits.       No acute fracture or traumatic malalignment. Scattered multilevel degenerative change without high-grade canal stenosis or neural foraminal narrowing and superimposed DISH.   Signed by: Christos Garcia 4/29/2024 4:04 PM Dictation workstation:   [id number]    CT head wo IV contrast    Result Date: 4/29/2024  Interpreted By:  Dyana Zhou, STUDY: CT HEAD WO IV CONTRAST;  4/29/2024 3:36 pm   INDICATION: Syncope and fall   COMPARISON: 09/18/2018   ACCESSION NUMBER(S): [id number]   ORDERING CLINICIAN: SINAI LITTLE   TECHNIQUE: Noncontrast axial CT scan of head was performed. Angled reformats in brain and bone windows were generated. The images were reviewed in bone, brain, blood and soft tissue windows. Sagittal and coronal reformations were completed by the technologist at the acquisition scanner.   All CT  examinations are performed with 1 or more of the following dose reduction techniques: Automated exposure control, adjustment of mA and/or kv according to patient's size, or use of iterative reconstruction techniques.   FINDINGS: CSF Spaces: The ventricles, sulci and basal cisterns mildly prominent due to age-appropriate atrophy. No extra-axial fluid collection is present.   Parenchyma:  The grey-white differentiation is intact. There is no mass effect or midline shift.  There is no intracranial hemorrhage.   Calvarium: The calvarium is unremarkable.   Paranasal sinuses and mastoids: Visualized paranasal sinuses and mastoids are clear. Of note, the enlargement of the extra-ocular muscles bilaterally particularly affecting the medial rectus has subsided since the prior study.       Slight age-appropriate atrophy with no acute intracranial process.   MACRO: None     Signed by: Dyana Zhou 4/29/2024 3:43 PM Dictation workstation:   [id number]    XR chest 1 view    Result Date: 4/29/2024  Interpreted By:  Dyana Zhou, STUDY: XR CHEST 1 VIEW 4/29/2024 3:16 pm   INDICATION: Signs/Symptoms:Syncope   COMPARISON: 09/18/2018   ACCESSION NUMBER(S): [id number]   ORDERING CLINICIAN: SINAI LITTLE   TECHNIQUE: Portable erect view of the chest   FINDINGS: The cardiac size is within normal limits with calcified plaque visible within the aortic arch. The lungs are clear without pleural abnormality.       No acute cardiopulmonary disease.   Signed by: Dyana Zhou 4/29/2024 3:37 PM Dictation workstation:   [id number]   Results for orders placed or performed during the hospital encounter of 04/29/24 (from the past 24 hour(s))   D-Dimer, VTE Exclusion   Result Value Ref Range    D-Dimer, Quantitative VTE Exclusion 1,097 (H) <=500 ng/mL FEU   B-Type Natriuretic Peptide   Result Value Ref Range    BNP 86 0 - 99 pg/mL   Troponin I, High Sensitivity   Result Value Ref Range    Troponin I, High Sensitivity 8 0 - 20 ng/L   Comprehensive  Metabolic Panel   Result Value Ref Range    Glucose 107 (H) 74 - 99 mg/dL    Sodium 137 136 - 145 mmol/L    Potassium 3.9 3.5 - 5.3 mmol/L    Chloride 104 98 - 107 mmol/L    Bicarbonate 26 21 - 32 mmol/L    Anion Gap 11 10 - 20 mmol/L    Urea Nitrogen 17 6 - 23 mg/dL    Creatinine 1.56 (H) 0.50 - 1.30 mg/dL    eGFR 46 (L) >60 mL/min/1.73m*2    Calcium 9.4 8.6 - 10.3 mg/dL    Albumin 4.1 3.4 - 5.0 g/dL    Alkaline Phosphatase 95 33 - 136 U/L    Total Protein 7.6 6.4 - 8.2 g/dL    AST 19 9 - 39 U/L    Bilirubin, Total 0.3 0.0 - 1.2 mg/dL    ALT 14 10 - 52 U/L   Magnesium   Result Value Ref Range    Magnesium 2.19 1.60 - 2.40 mg/dL   CBC and Auto Differential   Result Value Ref Range    WBC 7.8 4.4 - 11.3 x10*3/uL    nRBC 0.0 0.0 - 0.0 /100 WBCs    RBC 5.68 4.50 - 5.90 x10*6/uL    Hemoglobin 13.4 (L) 13.5 - 17.5 g/dL    Hematocrit 44.8 41.0 - 52.0 %    MCV 79 (L) 80 - 100 fL    MCH 23.6 (L) 26.0 - 34.0 pg    MCHC 29.9 (L) 32.0 - 36.0 g/dL    RDW 19.2 (H) 11.5 - 14.5 %    Platelets 275 150 - 450 x10*3/uL    Neutrophils % 62.0 40.0 - 80.0 %    Immature Granulocytes %, Automated 0.3 0.0 - 0.9 %    Lymphocytes % 21.2 13.0 - 44.0 %    Monocytes % 13.0 2.0 - 10.0 %    Eosinophils % 2.7 0.0 - 6.0 %    Basophils % 0.8 0.0 - 2.0 %    Neutrophils Absolute 4.82 1.60 - 5.50 x10*3/uL    Immature Granulocytes Absolute, Automated 0.02 0.00 - 0.50 x10*3/uL    Lymphocytes Absolute 1.65 0.80 - 3.00 x10*3/uL    Monocytes Absolute 1.01 (H) 0.05 - 0.80 x10*3/uL    Eosinophils Absolute 0.21 0.00 - 0.40 x10*3/uL    Basophils Absolute 0.06 0.00 - 0.10 x10*3/uL   Lipid Panel   Result Value Ref Range    Cholesterol 121 0 - 199 mg/dL    HDL-Cholesterol 35.2 mg/dL    Cholesterol/HDL Ratio 3.4     LDL Calculated 42 <=99 mg/dL    VLDL 44 (H) 0 - 40 mg/dL    Triglycerides 221 (H) 0 - 149 mg/dL    Non HDL Cholesterol 86 0 - 149 mg/dL   Hemoglobin A1c   Result Value Ref Range    Hemoglobin A1C 7.1 (H) see below %    Estimated Average Glucose 157 Not  Established mg/dL   Urinalysis with Reflex Culture and Microscopic   Result Value Ref Range    Color, Urine Straw Straw, Yellow    Appearance, Urine Clear Clear    Specific Gravity, Urine 1.008 1.005 - 1.035    pH, Urine 5.0 5.0, 5.5, 6.0, 6.5, 7.0, 7.5, 8.0    Protein, Urine 100 (2+) (N) NEGATIVE mg/dL    Glucose, Urine >=500 (3+) (A) NEGATIVE mg/dL    Blood, Urine SMALL (1+) (A) NEGATIVE    Ketones, Urine NEGATIVE NEGATIVE mg/dL    Bilirubin, Urine NEGATIVE NEGATIVE    Urobilinogen, Urine <2.0 <2.0 mg/dL    Nitrite, Urine NEGATIVE NEGATIVE    Leukocyte Esterase, Urine NEGATIVE NEGATIVE   Extra Urine Gray Tube   Result Value Ref Range    Extra Tube Hold for add-ons.    Urinalysis Microscopic   Result Value Ref Range    WBC, Urine NONE 1-5, NONE /HPF    RBC, Urine 1-2 NONE, 1-2, 3-5 /HPF   POCT GLUCOSE   Result Value Ref Range    POCT Glucose 91 74 - 99 mg/dL   Basic Metabolic Panel   Result Value Ref Range    Glucose 90 74 - 99 mg/dL    Sodium 138 136 - 145 mmol/L    Potassium 4.0 3.5 - 5.3 mmol/L    Chloride 108 (H) 98 - 107 mmol/L    Bicarbonate 24 21 - 32 mmol/L    Anion Gap 10 10 - 20 mmol/L    Urea Nitrogen 17 6 - 23 mg/dL    Creatinine 1.40 (H) 0.50 - 1.30 mg/dL    eGFR 53 (L) >60 mL/min/1.73m*2    Calcium 8.5 (L) 8.6 - 10.3 mg/dL   Transthoracic Echo (TTE) Complete   Result Value Ref Range    AV pk derrek 2.23 m/s    AV mn grad 10.0 mmHg    LVOT diam 2.10 cm    LV Biplane EF 55 %    MV E/A ratio 0.82     Tricuspid annular plane systolic excursion 2.4 cm    RV free wall pk S' 15.70 cm/s    LV GLS 18.1 %    LVIDd 5.40 cm    RVSP 34.6 mmHg    Aortic Valve Area by Continuity of VTI 1.72 cm2    Aortic Valve Area by Continuity of Peak Velocity 1.60 cm2    AV pk grad 19.9 mmHg    LV A4C EF 57.2    POCT GLUCOSE   Result Value Ref Range    POCT Glucose 155 (H) 74 - 99 mg/dL       MR brain wo IV contrast    Result Date: 4/30/2024  Interpreted By:  Eligio Hendricks, STUDY: MR BRAIN WO IV CONTRAST; MR ANGIO NECK WO IV  CONTRAST; MR ANGIO HEAD WO IV CONTRAST;  4/30/2024 10:59 am   INDICATION: Signs/Symptoms:rule out stroke.   COMPARISON: CT head dated 04/29/2024. MRI brain and MRA head and neck dated 09/19/2018.   ACCESSION NUMBER(S): [id number]; [id number]; [id number]   ORDERING CLINICIAN: ERIC SWEENEY   TECHNIQUE: 1) Standard multiplanar multisequence MR imaging was performed through the brain without intravenous contrast. 2) Noncontrast 3D time-of-flight MRA imaging was performed through the brain. 3) Noncontrast 2D time-of-flight MRA imaging was performed through the neck.   Repeat sequences obtained due to motion artifact.   FINDINGS: BRAIN:   Parenchyma: There is no diffusion restriction abnormality to suggest acute infarct.  No evidence of recent hemorrhage. There is no mass effect or midline shift. Mild-to-moderate chronic small vessel ischemic disease with small scattered T2/FLAIR white matter hyperintensities throughout the bilateral cerebral hemispheres.   CSF Spaces: The ventricles, sulci and basal cisterns are within normal limits for age with generalized brain atrophy. Basilar cisterns are patent.   Extra-axial spaces: No extra-axial fluid collection.   Paranasal Sinuses: Mild mucosal thickening of the bilateral ethmoids. Paranasal sinuses are otherwise well aerated.   Mastoids: Trace fluid within the right-greater-than-left mastoids.   Orbits: Bilateral native lens extractions.   Calvarium: No suspicious osseous marrow signal.     VASCULAR FINDINGS:   Common carotid arteries: Patent flow signal bilaterally within the visualized portions.   External carotid arteries: Patent flow signal bilaterally.   Internal carotid arteries: Patent flow signal bilaterally. Flow related artifact versus mild atherosclerotic involvement at the internal carotid artery origins bilaterally with no substantial (less than 20%) stenosis by NASCET criteria. Intracranial segments are patent without stenosis or definite aneurysm.    Anterior cerebral arteries: Normal flow signal bilaterally.   Middle cerebral arteries: Normal flow signal bilaterally.   Vertebral arteries: Right vertebral artery is dominant. Normal flow signal bilaterally within the visualized regions.   Basilar artery: Normal flow signal.   Posterior communicating arteries: Diminutive versus absent.   Posterior cerebral arteries: Normal flow signal bilaterally.       No acute infarct, recent hemorrhage, or intracranial mass effect. Mild-to-moderate chronic small vessel ischemic disease and generalized brain atrophy.   No evidence of source vessel arterial occlusion, flow significant stenosis, or aneurysm within the head and neck.   MACRO: None   Signed by: Eligio Hendricks 4/30/2024 11:15 AM Dictation workstation:   [id number]   CT head wo IV contrast    Result Date: 4/29/2024  Interpreted By:  Dyana Zhou, STUDY: CT HEAD WO IV CONTRAST;  4/29/2024 3:36 pm   INDICATION: Syncope and fall   COMPARISON: 09/18/2018   ACCESSION NUMBER(S): [id number]   ORDERING CLINICIAN: SINAI LITTLE   TECHNIQUE: Noncontrast axial CT scan of head was performed. Angled reformats in brain and bone windows were generated. The images were reviewed in bone, brain, blood and soft tissue windows. Sagittal and coronal reformations were completed by the technologist at the acquisition scanner.   All CT examinations are performed with 1 or more of the following dose reduction techniques: Automated exposure control, adjustment of mA and/or kv according to patient's size, or use of iterative reconstruction techniques.   FINDINGS: CSF Spaces: The ventricles, sulci and basal cisterns mildly prominent due to age-appropriate atrophy. No extra-axial fluid collection is present.   Parenchyma:  The grey-white differentiation is intact. There is no mass effect or midline shift.  There is no intracranial hemorrhage.   Calvarium: The calvarium is unremarkable.   Paranasal sinuses and mastoids: Visualized paranasal  sinuses and mastoids are clear. Of note, the enlargement of the extra-ocular muscles bilaterally particularly affecting the medial rectus has subsided since the prior study.       Slight age-appropriate atrophy with no acute intracranial process.   MACRO: None     Signed by: Dyana Zhou 4/29/2024 3:43 PM Dictation workstation:   [id number]   Transthoracic Echo (TTE) Complete    Result Date: 4/30/2024    [facility], [address], [place] [facility] [id number] [phone] and                                 Fax [phone] TRANSTHORACIC ECHOCARDIOGRAM REPORT  Patient Name:      ANDREINA AGUDELO         Reading Physician:    [id number] Ryan Fernández MD Study Date:        4/30/2024            Ordering Provider:    [id number] MONIKA HERRON MRN/PID:           [id number]             Fellow: Accession#:        [id number]         Nurse: Date of Birth/Age: 1950 / 73 years Sonographer:          Bethel Messer [facility] Gender:            M                    Additional Staff: Height:            175.26 cm            Admit Date:           4/29/2024 Weight:            91.63 kg             Admission Status:     Inpatient -                                                               Routine BSA / BMI:         2.07 m2 / 29.83      Encounter#:           [id number]                    kg/m2                                         Department Location:  [facility] Blood Pressure: 149 /80 mmHg Study Type:    TRANSTHORACIC ECHO (TTE) COMPLETE Diagnosis/ICD: Syncope-R55 CPT Code:      Echo Complete w Full Doppler-77604; Myocardial Strain                Imaging-[id number] Patient History: BMI:              Obese >30 Pertinent         CAD, HTN, Hyperlipidemia and TIA. s/p MI, PCI-stent x 2, History:          vertigo. Study Detail: The following Echo studies were performed: 2D, M-Mode, Doppler,               color flow and  Strain. Technically challenging study due to body               habitus.  PHYSICIAN INTERPRETATION: Left Ventricle: The left ventricular systolic function is normal, with an estimated ejection fraction of 55-60%. There are no regional wall motion abnormalities. The left ventricular cavity size is normal. Left Ventricular Global Longitudinal Strain - 18.1 %. Spectral Doppler shows an impaired relaxation pattern of left ventricular diastolic filling. Left Atrium: The left atrium is mildly dilated. Right Ventricle: The right ventricle is normal in size. There is normal right ventricular global systolic function. Right Atrium: The right atrium is normal in size. Aortic Valve: The aortic valve appears structurally normal. There is evidence of mild aortic valve stenosis. There is no evidence of aortic valve regurgitation. The peak instantaneous gradient of the aortic valve is 19.9 mmHg. The mean gradient of the aortic valve is 10.0 mmHg. Mitral Valve: The mitral valve is mild to moderately thickened. There is mild mitral valve regurgitation. Tricuspid Valve: The tricuspid valve is structurally normal. There is mild tricuspid regurgitation. Pulmonic Valve: The pulmonic valve is structurally normal. There is physiologic pulmonic valve regurgitation. Pericardium: There is no pericardial effusion noted. Aorta: The aortic root is normal.  CONCLUSIONS:  1. Left ventricular systolic function is normal with a 55-60% estimated ejection fraction.  2. Spectral Doppler shows an impaired relaxation pattern of left ventricular diastolic filling.  3. Mild aortic valve stenosis. QUANTITATIVE DATA SUMMARY: 2D MEASUREMENTS:                          Normal Ranges: IVSd:          1.20 cm   (0.6-1.1cm) LVPWd:         0.70 cm   (0.6-1.1cm) LVIDd:         5.40 cm   (3.9-5.9cm) LVIDs:         3.80 cm LV Mass Index: 93.2 g/m2 LV % FS        29.6 % LA VOLUME:                             Normal Ranges: LA Volume Index: 29.0 ml/m2 RA VOLUME BY A/L  METHOD:                       Normal Ranges: RA Area A4C: 14.0 cm2 M-MODE MEASUREMENTS:                  Normal Ranges: Ao Root: 3.50 cm (2.0-3.7cm) LAs:     4.10 cm (2.7-4.0cm) AORTA MEASUREMENTS:                    Normal Ranges: Asc Ao, d: 3.30 cm (2.1-3.4cm) LV SYSTOLIC FUNCTION BY 2D PLANIMETRY (MOD):                                          Normal Ranges: EF-A4C View:                      57.2 % (>=55%) EF-A2C View:                      51.5 % EF-Biplane:                       55.3 % Global Longitudinal Strain (GLS): 18.1 % LV DIASTOLIC FUNCTION:                               Normal Ranges: MV Peak E:        1.10 m/s    (0.7-1.2 m/s) MV Peak A:        1.34 m/s    (0.42-0.7 m/s) E/A Ratio:        0.82        (1.0-2.2) MV lateral e'     0.08 m/s MV medial e'      0.07 m/s MV A Dur:         127.00 msec PulmV Sys Claudy:    70.50 cm/s PulmV Daugherty Claudy:   51.70 cm/s PulmV S/D Claudy:    1.40 PulmV A Revs Claudy: 32.40 cm/s PulmV A Revs Dur: 137.00 msec MITRAL VALVE:                 Normal Ranges: MV DT: 275 msec (150-240msec) AORTIC VALVE:                                    Normal Ranges: AoV Vmax:                2.23 m/s  (<=1.7m/s) AoV Peak P.9 mmHg (<20mmHg) AoV Mean PG:             10.0 mmHg (1.7-11.5mmHg) LVOT Max Claudy:            1.03 m/s  (<=1.1m/s) AoV VTI:                 46.80 cm  (18-25cm) LVOT VTI:                23.30 cm LVOT Diameter:           2.10 cm   (1.8-2.4cm) AoV Area, VTI:           1.72 cm2  (2.5-5.5cm2) AoV Area,Vmax:           1.60 cm2  (2.5-4.5cm2) AoV Dimensionless Index: 0.50  RIGHT VENTRICLE: RV Basal 2.90 cm RV Mid   1.90 cm RV Major 7.9 cm TAPSE:   24.1 mm RV s'    0.16 m/s TRICUSPID VALVE/RVSP:                             Normal Ranges: Peak TR Velocity: 2.81 m/s RV Syst Pressure: 34.6 mmHg (< 30mmHg) PULMONIC VALVE:                      Normal Ranges: PV Max Claudy: 1.1 m/s  (0.6-0.9m/s) PV Max P.6 mmHg Pulmonary Veins: PulmV A Revs Dur: 137.00 msec PulmV A Revs Claudy: 32.40  cm/s PulmV Daugherty Claudy:   51.70 cm/s PulmV S/D Claudy:    1.40 PulmV Sys Claudy:    70.50 cm/s  [id number] Ryan Fernández MD Electronically signed on 4/30/2024 at 10:49:28 AM  ** Final **        Results from last 7 days   Lab Units 04/29/24  1515   HEMOGLOBIN A1C % 7.1*     BNP   Date/Time Value Ref Range Status   04/29/2024 03:15 PM 86 0 - 99 pg/mL Final        I have personally reviewed the following imaging results MR brain wo IV contrast    Result Date: 4/30/2024  Interpreted By:  Eligio Hendricks, STUDY: MR BRAIN WO IV CONTRAST; MR ANGIO NECK WO IV CONTRAST; MR ANGIO HEAD WO IV CONTRAST;  4/30/2024 10:59 am   INDICATION: Signs/Symptoms:rule out stroke.   COMPARISON: CT head dated 04/29/2024. MRI brain and MRA head and neck dated 09/19/2018.   ACCESSION NUMBER(S): [id number]; [id number]; [id number]   ORDERING CLINICIAN: ERIC SWEENEY   TECHNIQUE: 1) Standard multiplanar multisequence MR imaging was performed through the brain without intravenous contrast. 2) Noncontrast 3D time-of-flight MRA imaging was performed through the brain. 3) Noncontrast 2D time-of-flight MRA imaging was performed through the neck.   Repeat sequences obtained due to motion artifact.   FINDINGS: BRAIN:   Parenchyma: There is no diffusion restriction abnormality to suggest acute infarct.  No evidence of recent hemorrhage. There is no mass effect or midline shift. Mild-to-moderate chronic small vessel ischemic disease with small scattered T2/FLAIR white matter hyperintensities throughout the bilateral cerebral hemispheres.   CSF Spaces: The ventricles, sulci and basal cisterns are within normal limits for age with generalized brain atrophy. Basilar cisterns are patent.   Extra-axial spaces: No extra-axial fluid collection.   Paranasal Sinuses: Mild mucosal thickening of the bilateral ethmoids. Paranasal sinuses are otherwise well aerated.   Mastoids: Trace fluid within the right-greater-than-left mastoids.   Orbits: Bilateral native lens extractions.    Calvarium: No suspicious osseous marrow signal.     VASCULAR FINDINGS:   Common carotid arteries: Patent flow signal bilaterally within the visualized portions.   External carotid arteries: Patent flow signal bilaterally.   Internal carotid arteries: Patent flow signal bilaterally. Flow related artifact versus mild atherosclerotic involvement at the internal carotid artery origins bilaterally with no substantial (less than 20%) stenosis by NASCET criteria. Intracranial segments are patent without stenosis or definite aneurysm.   Anterior cerebral arteries: Normal flow signal bilaterally.   Middle cerebral arteries: Normal flow signal bilaterally.   Vertebral arteries: Right vertebral artery is dominant. Normal flow signal bilaterally within the visualized regions.   Basilar artery: Normal flow signal.   Posterior communicating arteries: Diminutive versus absent.   Posterior cerebral arteries: Normal flow signal bilaterally.       No acute infarct, recent hemorrhage, or intracranial mass effect. Mild-to-moderate chronic small vessel ischemic disease and generalized brain atrophy.   No evidence of source vessel arterial occlusion, flow significant stenosis, or aneurysm within the head and neck.   MACRO: None   Signed by: Eligio Hendricks 4/30/2024 11:15 AM Dictation workstation:   [id number]    MR angio head wo IV contrast    Result Date: 4/30/2024  Interpreted By:  Eligio Hendricks, STUDY: MR BRAIN WO IV CONTRAST; MR ANGIO NECK WO IV CONTRAST; MR ANGIO HEAD WO IV CONTRAST;  4/30/2024 10:59 am   INDICATION: Signs/Symptoms:rule out stroke.   COMPARISON: CT head dated 04/29/2024. MRI brain and MRA head and neck dated 09/19/2018.   ACCESSION NUMBER(S): [id number]; [id number]; [id number]   ORDERING CLINICIAN: ERIC SWEENEY   TECHNIQUE: 1) Standard multiplanar multisequence MR imaging was performed through the brain without intravenous contrast. 2) Noncontrast 3D time-of-flight MRA imaging was performed through the  brain. 3) Noncontrast 2D time-of-flight MRA imaging was performed through the neck.   Repeat sequences obtained due to motion artifact.   FINDINGS: BRAIN:   Parenchyma: There is no diffusion restriction abnormality to suggest acute infarct.  No evidence of recent hemorrhage. There is no mass effect or midline shift. Mild-to-moderate chronic small vessel ischemic disease with small scattered T2/FLAIR white matter hyperintensities throughout the bilateral cerebral hemispheres.   CSF Spaces: The ventricles, sulci and basal cisterns are within normal limits for age with generalized brain atrophy. Basilar cisterns are patent.   Extra-axial spaces: No extra-axial fluid collection.   Paranasal Sinuses: Mild mucosal thickening of the bilateral ethmoids. Paranasal sinuses are otherwise well aerated.   Mastoids: Trace fluid within the right-greater-than-left mastoids.   Orbits: Bilateral native lens extractions.   Calvarium: No suspicious osseous marrow signal.     VASCULAR FINDINGS:   Common carotid arteries: Patent flow signal bilaterally within the visualized portions.   External carotid arteries: Patent flow signal bilaterally.   Internal carotid arteries: Patent flow signal bilaterally. Flow related artifact versus mild atherosclerotic involvement at the internal carotid artery origins bilaterally with no substantial (less than 20%) stenosis by NASCET criteria. Intracranial segments are patent without stenosis or definite aneurysm.   Anterior cerebral arteries: Normal flow signal bilaterally.   Middle cerebral arteries: Normal flow signal bilaterally.   Vertebral arteries: Right vertebral artery is dominant. Normal flow signal bilaterally within the visualized regions.   Basilar artery: Normal flow signal.   Posterior communicating arteries: Diminutive versus absent.   Posterior cerebral arteries: Normal flow signal bilaterally.       No acute infarct, recent hemorrhage, or intracranial mass effect. Mild-to-moderate  chronic small vessel ischemic disease and generalized brain atrophy.   No evidence of source vessel arterial occlusion, flow significant stenosis, or aneurysm within the head and neck.   MACRO: None   Signed by: Eligio Hendricks 4/30/2024 11:15 AM Dictation workstation:   [id number]    MR angio neck wo IV contrast    Result Date: 4/30/2024  Interpreted By:  Eligio Hendricks, STUDY: MR BRAIN WO IV CONTRAST; MR ANGIO NECK WO IV CONTRAST; MR ANGIO HEAD WO IV CONTRAST;  4/30/2024 10:59 am   INDICATION: Signs/Symptoms:rule out stroke.   COMPARISON: CT head dated 04/29/2024. MRI brain and MRA head and neck dated 09/19/2018.   ACCESSION NUMBER(S): [id number]; [id number]; [id number]   ORDERING CLINICIAN: ERIC SWEENEY   TECHNIQUE: 1) Standard multiplanar multisequence MR imaging was performed through the brain without intravenous contrast. 2) Noncontrast 3D time-of-flight MRA imaging was performed through the brain. 3) Noncontrast 2D time-of-flight MRA imaging was performed through the neck.   Repeat sequences obtained due to motion artifact.   FINDINGS: BRAIN:   Parenchyma: There is no diffusion restriction abnormality to suggest acute infarct.  No evidence of recent hemorrhage. There is no mass effect or midline shift. Mild-to-moderate chronic small vessel ischemic disease with small scattered T2/FLAIR white matter hyperintensities throughout the bilateral cerebral hemispheres.   CSF Spaces: The ventricles, sulci and basal cisterns are within normal limits for age with generalized brain atrophy. Basilar cisterns are patent.   Extra-axial spaces: No extra-axial fluid collection.   Paranasal Sinuses: Mild mucosal thickening of the bilateral ethmoids. Paranasal sinuses are otherwise well aerated.   Mastoids: Trace fluid within the right-greater-than-left mastoids.   Orbits: Bilateral native lens extractions.   Calvarium: No suspicious osseous marrow signal.     VASCULAR FINDINGS:   Common carotid arteries: Patent flow  signal bilaterally within the visualized portions.   External carotid arteries: Patent flow signal bilaterally.   Internal carotid arteries: Patent flow signal bilaterally. Flow related artifact versus mild atherosclerotic involvement at the internal carotid artery origins bilaterally with no substantial (less than 20%) stenosis by NASCET criteria. Intracranial segments are patent without stenosis or definite aneurysm.   Anterior cerebral arteries: Normal flow signal bilaterally.   Middle cerebral arteries: Normal flow signal bilaterally.   Vertebral arteries: Right vertebral artery is dominant. Normal flow signal bilaterally within the visualized regions.   Basilar artery: Normal flow signal.   Posterior communicating arteries: Diminutive versus absent.   Posterior cerebral arteries: Normal flow signal bilaterally.       No acute infarct, recent hemorrhage, or intracranial mass effect. Mild-to-moderate chronic small vessel ischemic disease and generalized brain atrophy.   No evidence of source vessel arterial occlusion, flow significant stenosis, or aneurysm within the head and neck.   MACRO: None   Signed by: Eligio Hendricks 4/30/2024 11:15 AM Dictation workstation:   [id number]    Transthoracic Echo (TTE) Complete    Result Date: 4/30/2024    [facility], [address] [place] [id number] [phone] and                                 Fax [phone] TRANSTHORACIC ECHOCARDIOGRAM REPORT  Patient Name:      ANDREINA AGUDELO         Reading Physician:    [id number] Ryan Fernández MD Study Date:        4/30/2024            Ordering Provider:    [id number] MONIKA HERRON MRN/PID:           [id number]             Fellow: Accession#:        [id number]         Nurse: Date of Birth/Age: 1950 / 73 years Sonographer:          Bethel Messer FREYA Gender:            M                    Additional  Staff: Height:            175.26 cm            Admit Date:           4/29/2024 Weight:            91.63 kg             Admission Status:     Inpatient -                                                               Routine BSA / BMI:         2.07 m2 / 29.83      Encounter#:           [id number]                    kg/m2                                         Department Location:  [facility] Blood Pressure: 149 /80 mmHg Study Type:    TRANSTHORACIC ECHO (TTE) COMPLETE Diagnosis/ICD: Syncope-R55 CPT Code:      Echo Complete w Full Doppler-55393; Myocardial Strain                Imaging-[id number] Patient History: BMI:              Obese >30 Pertinent         CAD, HTN, Hyperlipidemia and TIA. s/p MI, PCI-stent x 2, History:          vertigo. Study Detail: The following Echo studies were performed: 2D, M-Mode, Doppler,               color flow and Strain. Technically challenging study due to body               habitus.  PHYSICIAN INTERPRETATION: Left Ventricle: The left ventricular systolic function is normal, with an estimated ejection fraction of 55-60%. There are no regional wall motion abnormalities. The left ventricular cavity size is normal. Left Ventricular Global Longitudinal Strain - 18.1 %. Spectral Doppler shows an impaired relaxation pattern of left ventricular diastolic filling. Left Atrium: The left atrium is mildly dilated. Right Ventricle: The right ventricle is normal in size. There is normal right ventricular global systolic function. Right Atrium: The right atrium is normal in size. Aortic Valve: The aortic valve appears structurally normal. There is evidence of mild aortic valve stenosis. There is no evidence of aortic valve regurgitation. The peak instantaneous gradient of the aortic valve is 19.9 mmHg. The mean gradient of the aortic valve is 10.0 mmHg. Mitral Valve: The mitral valve is mild to moderately thickened. There is mild mitral valve regurgitation. Tricuspid Valve: The tricuspid valve is  structurally normal. There is mild tricuspid regurgitation. Pulmonic Valve: The pulmonic valve is structurally normal. There is physiologic pulmonic valve regurgitation. Pericardium: There is no pericardial effusion noted. Aorta: The aortic root is normal.  CONCLUSIONS:  1. Left ventricular systolic function is normal with a 55-60% estimated ejection fraction.  2. Spectral Doppler shows an impaired relaxation pattern of left ventricular diastolic filling.  3. Mild aortic valve stenosis. QUANTITATIVE DATA SUMMARY: 2D MEASUREMENTS:                          Normal Ranges: IVSd:          1.20 cm   (0.6-1.1cm) LVPWd:         0.70 cm   (0.6-1.1cm) LVIDd:         5.40 cm   (3.9-5.9cm) LVIDs:         3.80 cm LV Mass Index: 93.2 g/m2 LV % FS        29.6 % LA VOLUME:                             Normal Ranges: LA Volume Index: 29.0 ml/m2 RA VOLUME BY A/L METHOD:                       Normal Ranges: RA Area A4C: 14.0 cm2 M-MODE MEASUREMENTS:                  Normal Ranges: Ao Root: 3.50 cm (2.0-3.7cm) LAs:     4.10 cm (2.7-4.0cm) AORTA MEASUREMENTS:                    Normal Ranges: Asc Ao, d: 3.30 cm (2.1-3.4cm) LV SYSTOLIC FUNCTION BY 2D PLANIMETRY (MOD):                                          Normal Ranges: EF-A4C View:                      57.2 % (>=55%) EF-A2C View:                      51.5 % EF-Biplane:                       55.3 % Global Longitudinal Strain (GLS): 18.1 % LV DIASTOLIC FUNCTION:                               Normal Ranges: MV Peak E:        1.10 m/s    (0.7-1.2 m/s) MV Peak A:        1.34 m/s    (0.42-0.7 m/s) E/A Ratio:        0.82        (1.0-2.2) MV lateral e'     0.08 m/s MV medial e'      0.07 m/s MV A Dur:         127.00 msec PulmV Sys Clauyd:    70.50 cm/s PulmV Daugherty Claudy:   51.70 cm/s PulmV S/D Claudy:    1.40 PulmV A Revs Claudy: 32.40 cm/s PulmV A Revs Dur: 137.00 msec MITRAL VALVE:                 Normal Ranges: MV DT: 275 msec (150-240msec) AORTIC VALVE:                                    Normal  Ranges: AoV Vmax:                2.23 m/s  (<=1.7m/s) AoV Peak P.9 mmHg (<20mmHg) AoV Mean PG:             10.0 mmHg (1.7-11.5mmHg) LVOT Max Claudy:            1.03 m/s  (<=1.1m/s) AoV VTI:                 46.80 cm  (18-25cm) LVOT VTI:                23.30 cm LVOT Diameter:           2.10 cm   (1.8-2.4cm) AoV Area, VTI:           1.72 cm2  (2.5-5.5cm2) AoV Area,Vmax:           1.60 cm2  (2.5-4.5cm2) AoV Dimensionless Index: 0.50  RIGHT VENTRICLE: RV Basal 2.90 cm RV Mid   1.90 cm RV Major 7.9 cm TAPSE:   24.1 mm RV s'    0.16 m/s TRICUSPID VALVE/RVSP:                             Normal Ranges: Peak TR Velocity: 2.81 m/s RV Syst Pressure: 34.6 mmHg (< 30mmHg) PULMONIC VALVE:                      Normal Ranges: PV Max Claudy: 1.1 m/s  (0.6-0.9m/s) PV Max P.6 mmHg Pulmonary Veins: PulmV A Revs Dur: 137.00 msec PulmV A Revs Claudy: 32.40 cm/s PulmV Daugherty Claudy:   51.70 cm/s PulmV S/D Claudy:    1.40 PulmV Sys Claudy:    70.50 cm/s  [id number] Ryan Fernández MD Electronically signed on 2024 at 10:49:28 AM  ** Final **     CT cervical spine wo IV contrast    Result Date: 2024  Interpreted By:  Christos Garcia, STUDY: CT CERVICAL SPINE WO IV CONTRAST;  2024 3:36 pm   INDICATION: Signs/Symptoms:Fall over 65.   COMPARISON: None.   ACCESSION NUMBER(S): [id number]   ORDERING CLINICIAN: SINAI LITTLE   TECHNIQUE: Thin section axial images were obtained from the skull base down through the thoracic inlet. Sagittal and coronal reconstruction images were generated. Soft tissue, lung, and bone windows were reviewed.   FINDINGS: ALIGNMENT: Straightening of the cervical lordosis. Atlantoaxial interval is maintained. Vertebral body heights are maintained. Disc spaces are within normal limits.   VERTEBRAL BODIES AND POSTERIOR ELEMENTS: No cervical spine compression fracture.  No posterior element fracture.  No destructive bone lesion. No listhesis.   SPINAL CANAL: Multilevel degenerative changes without high-grade canal  stenosis. Superimposed and multilevel anterior bridging osteophytes, compatible with DISH..   NECK SOFT TISSUES: Within normal limits.   LUNG APICES: Imaged portion of the lung apices are within normal limits.   SKULL BASE: Within normal limits.       No acute fracture or traumatic malalignment. Scattered multilevel degenerative change without high-grade canal stenosis or neural foraminal narrowing and superimposed DISH.   Signed by: Christos Garcia 4/29/2024 4:04 PM Dictation workstation:   [id number]    CT head wo IV contrast    Result Date: 4/29/2024  Interpreted By:  Dyana Zhou, STUDY: CT HEAD WO IV CONTRAST;  4/29/2024 3:36 pm   INDICATION: Syncope and fall   COMPARISON: 09/18/2018   ACCESSION NUMBER(S): [id number]   ORDERING CLINICIAN: SINAI LITTLE   TECHNIQUE: Noncontrast axial CT scan of head was performed. Angled reformats in brain and bone windows were generated. The images were reviewed in bone, brain, blood and soft tissue windows. Sagittal and coronal reformations were completed by the technologist at the acquisition scanner.   All CT examinations are performed with 1 or more of the following dose reduction techniques: Automated exposure control, adjustment of mA and/or kv according to patient's size, or use of iterative reconstruction techniques.   FINDINGS: CSF Spaces: The ventricles, sulci and basal cisterns mildly prominent due to age-appropriate atrophy. No extra-axial fluid collection is present.   Parenchyma:  The grey-white differentiation is intact. There is no mass effect or midline shift.  There is no intracranial hemorrhage.   Calvarium: The calvarium is unremarkable.   Paranasal sinuses and mastoids: Visualized paranasal sinuses and mastoids are clear. Of note, the enlargement of the extra-ocular muscles bilaterally particularly affecting the medial rectus has subsided since the prior study.       Slight age-appropriate atrophy with no acute intracranial process.   MACRO: None      Signed by: Dyana Zhou 4/29/2024 3:43 PM Dictation workstation:   [id number]    XR chest 1 view    Result Date: 4/29/2024  Interpreted By:  Dyana Zhou, STUDY: XR CHEST 1 VIEW 4/29/2024 3:16 pm   INDICATION: Signs/Symptoms:Syncope   COMPARISON: 09/18/2018   ACCESSION NUMBER(S): [id number]   ORDERING CLINICIAN: SINAI LITTLE   TECHNIQUE: Portable erect view of the chest   FINDINGS: The cardiac size is within normal limits with calcified plaque visible within the aortic arch. The lungs are clear without pleural abnormality.       No acute cardiopulmonary disease.   Signed by: Dyana Zhou 4/29/2024 3:37 PM Dictation workstation:   [id number]       Assessment/Plan   Principal Problem:    Dysequilibrium      Type:  TIA versus CVA  Neurological manifestations: NIHSS (worst at presentation): 1, ataxia  Diagnostic evaluation: MRI MRA pending.  CT obtained  Antiplatelet/antithrombotic plan for stroke prevention: Aspirin, statin, Plavix  VTE prophylaxis: Heparin  Vascular Risk Factor modification goals:  Blood pressure goals: avoid hypotension SBP <100 that could worsen cerebral perfusion, Ischemic stroke- early permissive hypertension SBP < 220 mmHg with cautious inpatient lowering  Lipid Goals: education on healthy diet and statin therapy to maintain or achieve goal LDL-cholesterol < 70mg  Glucose Goals: early treatment of hyperglycemia to goal glucose 140-180 mg/dl with long-term goal A1c < 7%   Smoking Cessation and Education  Assessment for Rehabilitation needs   Patient and family education on signs and symptoms of stroke, calling 911, healthy strategies for stroke prevention.         Impression: The patient is a 73-year-old male with multiple stroke risk factors who was in his usual state of health until recently.  The patient had a loss of consciousness.  His neurological examination is normal.  The differential diagnosis for his loss of consciousness includes vasovagal syncope, seizure, TIA and cardiac  arrhythmia.    Plan: The patient is doing well from a neurological standpoint and is back to his baseline.  The patient needs an EEG as an outpatient.  The patient will need a Zio patch for 2 weeks.  The patient should continue aspirin.    The patient needs to continue stroke risk factor modification.   The patient needs a PT, OT, social service, rehab and speech therapy consult.  The patient needs DVT prophylaxis.  The patient needs neurochecks as per protocol.  I will send the note to Dr. Avendaño.  Thank you very much for sending me this very interesting consultation.  I discussed all these issues in detail with the patient and answered all their questions.  I will sign off for now.  The patient needs follow-up with their primary care doctor within 2 weeks of discharge.  [facility].      Savana Arroyo MD

## 2024-04-30 NOTE — PROGRESS NOTES
Occupational Therapy                 Therapy Communication Note    Patient Name: Dillon Reyes  MRN: [id number]  Today's Date: 4/30/2024     Discipline: Occupational Therapy    Missed Visit Reason: Patient in a medical procedure (patient being transported to CT scan. will continue to reattempt as able/approrpiate.)    Missed Time: Attempt

## 2024-04-30 NOTE — PROGRESS NOTES
04/30/24 1209   Discharge Planning   Living Arrangements Spouse/significant other   Support Systems Spouse/significant other   Assistance Needed none   Type of Residence Private residence   Number of Stairs to Enter Residence 16   Number of Stairs Within Residence 3   Patient expects to be discharged to: home- pending therapy     4/30/2024  Met with pt in room, introduced self and explained role. Verified insurance, address, phone.   PCP- DR NICOLAS Freitas  Pt is from home, lives with wife.  Stated he is independent. No use of any assistive devices. (Does have cane/walker if needed). Stated no difficulty on the steps. Performs own ADL's.  Grab bars in the bathroom.  Pt and wife both drive.  Wife works.  Therapies ordered. Ct Team will follow for further needs.    Jyoti Mcnulty RN [facility]

## 2024-04-30 NOTE — PROGRESS NOTES
Physical Therapy                 Therapy Communication Note    Patient Name: Dillon Reyes  MRN: [id number]  Today's Date: 4/30/2024     Discipline: Physical Therapy    Missed Visit Reason: Missed Visit Reason: Patient in a medical procedure    Missed Time: Attempt    Comment:  Second attempt this date.  Echo and MRI in am, currently going to CT scan.  Will attempt PT Eval as patient available

## 2024-05-03 LAB
ATRIAL RATE: 67 BPM
P AXIS: 16 DEGREES
PR INTERVAL: 140 MS
Q ONSET: 252 MS
QRS COUNT: 11 BEATS
QRS DURATION: 107 MS
QT INTERVAL: 411 MS
QTC CALCULATION(BAZETT): 434 MS
QTC FREDERICIA: 426 MS
R AXIS: -24 DEGREES
T AXIS: 38 DEGREES
T OFFSET: 458 MS
VENTRICULAR RATE: 67 BPM

## 2024-05-08 PROCEDURE — 93005 ELECTROCARDIOGRAM TRACING: CPT

## 2024-05-10 LAB
ATRIAL RATE: 61 BPM
P AXIS: 10 DEGREES
PR INTERVAL: 135 MS
Q ONSET: 252 MS
QRS COUNT: 10 BEATS
QRS DURATION: 110 MS
QT INTERVAL: 429 MS
QTC CALCULATION(BAZETT): 432 MS
QTC FREDERICIA: 431 MS
R AXIS: -17 DEGREES
T AXIS: 21 DEGREES
T OFFSET: 467 MS
VENTRICULAR RATE: 61 BPM